# Patient Record
Sex: FEMALE | Race: BLACK OR AFRICAN AMERICAN | Employment: UNEMPLOYED | URBAN - METROPOLITAN AREA
[De-identification: names, ages, dates, MRNs, and addresses within clinical notes are randomized per-mention and may not be internally consistent; named-entity substitution may affect disease eponyms.]

---

## 2019-07-24 ENCOUNTER — TELEPHONE (OUTPATIENT)
Dept: FAMILY MEDICINE CLINIC | Age: 29
End: 2019-07-24

## 2019-07-25 ENCOUNTER — OFFICE VISIT (OUTPATIENT)
Dept: FAMILY MEDICINE CLINIC | Age: 29
End: 2019-07-25

## 2019-07-25 VITALS
BODY MASS INDEX: 22.01 KG/M2 | DIASTOLIC BLOOD PRESSURE: 75 MMHG | WEIGHT: 118.4 LBS | RESPIRATION RATE: 12 BRPM | HEART RATE: 100 BPM | TEMPERATURE: 99.4 F | SYSTOLIC BLOOD PRESSURE: 103 MMHG

## 2019-07-25 DIAGNOSIS — Z23 NEED FOR VACCINE FOR DT (DIPHTHERIA-TETANUS): ICD-10-CM

## 2019-07-25 DIAGNOSIS — R06.02 SOB (SHORTNESS OF BREATH): ICD-10-CM

## 2019-07-25 DIAGNOSIS — Z11.4 ENCOUNTER FOR SCREENING FOR HIV: ICD-10-CM

## 2019-07-25 DIAGNOSIS — Z13.220 SCREENING CHOLESTEROL LEVEL: ICD-10-CM

## 2019-07-25 DIAGNOSIS — R07.89 CHEST TIGHTNESS: Primary | ICD-10-CM

## 2019-07-25 PROCEDURE — 90714 TD VACC NO PRESV 7 YRS+ IM: CPT | Performed by: FAMILY MEDICINE

## 2019-07-25 PROCEDURE — 90471 IMMUNIZATION ADMIN: CPT | Performed by: FAMILY MEDICINE

## 2019-07-25 PROCEDURE — 99214 OFFICE O/P EST MOD 30 MIN: CPT | Performed by: FAMILY MEDICINE

## 2019-07-25 RX ORDER — ALBUTEROL SULFATE 90 UG/1
2 AEROSOL, METERED RESPIRATORY (INHALATION) EVERY 6 HOURS PRN
Qty: 3 INHALER | Refills: 1 | Status: SHIPPED | OUTPATIENT
Start: 2019-07-25

## 2020-01-09 ENCOUNTER — CLINICAL DOCUMENTATION (OUTPATIENT)
Dept: PSYCHOLOGY | Age: 30
End: 2020-01-09

## 2020-01-09 ENCOUNTER — OFFICE VISIT (OUTPATIENT)
Dept: FAMILY MEDICINE CLINIC | Age: 30
End: 2020-01-09

## 2020-01-09 VITALS
BODY MASS INDEX: 20.78 KG/M2 | RESPIRATION RATE: 14 BRPM | WEIGHT: 111.8 LBS | HEART RATE: 66 BPM | SYSTOLIC BLOOD PRESSURE: 106 MMHG | DIASTOLIC BLOOD PRESSURE: 68 MMHG

## 2020-01-09 PROCEDURE — 99213 OFFICE O/P EST LOW 20 MIN: CPT | Performed by: FAMILY MEDICINE

## 2020-01-09 ASSESSMENT — PATIENT HEALTH QUESTIONNAIRE - PHQ9
SUM OF ALL RESPONSES TO PHQ QUESTIONS 1-9: 0
SUM OF ALL RESPONSES TO PHQ QUESTIONS 1-9: 0
1. LITTLE INTEREST OR PLEASURE IN DOING THINGS: 0
2. FEELING DOWN, DEPRESSED OR HOPELESS: 0
SUM OF ALL RESPONSES TO PHQ9 QUESTIONS 1 & 2: 0

## 2020-01-09 NOTE — PROGRESS NOTES
Patient is here for panic and anxiety . She has had 3-4 episodes in the past 2 weeks. One she noticed after smoking marijuana , which she does not usually do. That attack lasted 24 hours. One episode was at work in the am .  She works at [employer] She does not recall specifics of the other 2 attacks. The attacks lasted 45 minutes and then started again and was sent home. She does not get stressed with work or bosses. She gets shortness of breath ,  Palpitations , dizziness and shaky. She did not get chest pain . Her top stressors are relationship and finances. He does not smoke. She works a lot - 61 hour/ week. He works 40 hours/ week. He jokes about her working so much. Her sleeping varies the past 1 year . She cn go to bed at 9 pm or not until 3am.  She sleeps 4-7 hours/ night. At times she does not go to bed due to not tired. Her prior job involved 4 pm - 4 am.  She has been at new job for 7 months and is happier there. No suicidal or homicidal ideation. Denies depression. No regular caffeine. She no longer drinks Red Bull. Drinks alcohol - beer 1 per day. Starting to meditate. Walks to walk and back 20 minutes each way. Patient did not do prior labs and does not want to do today or in near future. Denies weight changes, constipation or diarrhea, or heat/ cold intolerance. Review of Systems    ROS: All other systems were reviewed and are negative . Patient's allergies and medications were reviewed. Patient's past medical, surgical, social , and family history were reviewed. OBJECTIVE:  /68   Pulse 66   Resp 14   Wt 111 lb 12.8 oz (50.7 kg)   LMP 12/07/2019   Breastfeeding? No   BMI 20.78 kg/m²     Physical Exam    General: NAD, cooperative, alert and oriented X 3. Mood / affect is good. good insight. well hydrated. HEENT: PERRLA, EOMI, TMs - clear. Nasopharynx clear. Neck : no lymphadenopathy, supple, FROM  CV: Regular rate and rhythm.  JEANIE 3/6 , no  rub/

## 2020-01-09 NOTE — PATIENT INSTRUCTIONS
Patient Education        Panic Attacks: Care Instructions  Your Care Instructions    During a panic attack, you may have a feeling of intense fear or terror, trouble breathing, chest pain or tightness, heartbeat changes, dizziness, sweating, and shaking. A panic attack starts suddenly and usually lasts from 5 to 20 minutes but may last even longer. You have the most anxiety about 10 minutes after the attack starts. An attack can begin with a stressful event, or it can happen without a cause. Although panic attacks can cause scary symptoms, you can learn to manage them with self-care, counseling, and medicine. Follow-up care is a key part of your treatment and safety. Be sure to make and go to all appointments, and call your doctor if you are having problems. It's also a good idea to know your test results and keep a list of the medicines you take. How can you care for yourself at home? · Take your medicine exactly as directed. Call your doctor if you think you are having a problem with your medicine. · Go to your counseling sessions and follow-up appointments. · Recognize and accept your anxiety. Then, when you are in a situation that makes you anxious, say to yourself, \"This is not an emergency. I feel uncomfortable, but I am not in danger. I can keep going even if I feel anxious. \"  · Be kind to your body:  ? Relieve tension with exercise or a massage. ? Get enough rest.  ? Avoid alcohol, caffeine, nicotine, and illegal drugs. They can increase your anxiety level, cause sleep problems, or trigger a panic attack. ? Learn and do relaxation techniques. See below for more about these techniques. · Engage your mind. Get out and do something you enjoy. Go to a funny movie, or take a walk or hike. Plan your day. Having too much or too little to do can make you anxious. · Keep a record of your symptoms.  Discuss your fears with a good friend or family member, or join a support group for people with similar problems. Talking to others sometimes relieves stress. · Get involved in social groups, or volunteer to help others. Being alone sometimes makes things seem worse than they are. · Get at least 30 minutes of exercise on most days of the week to relieve stress. Walking is a good choice. You also may want to do other activities, such as running, swimming, cycling, or playing tennis or team sports. Relaxation techniques  Do relaxation exercises for 10 to 20 minutes a day. You can play soothing, relaxing music while you do them, if you wish. · Tell others in your house that you are going to do your relaxation exercises. Ask them not to disturb you. · Find a comfortable place, away from all distractions and noise. · Lie down on your back, or sit with your back straight. · Focus on your breathing. Make it slow and steady. · Breathe in through your nose. Breathe out through either your nose or mouth. · Breathe deeply, filling up the area between your navel and your rib cage. Breathe so that your belly goes up and down. · Do not hold your breath. · Breathe like this for 5 to 10 minutes. Notice the feeling of calmness throughout your whole body. As you continue to breathe slowly and deeply, relax by doing the following for another 5 to 10 minutes:  · Tighten and relax each muscle group in your body. You can begin at your toes and work your way up to your head. · Imagine your muscle groups relaxing and becoming heavy. · Empty your mind of all thoughts. · Let yourself relax more and more deeply. · Become aware of the state of calmness that surrounds you. · When your relaxation time is over, you can bring yourself back to alertness by moving your fingers and toes and then your hands and feet and then stretching and moving your entire body. Sometimes people fall asleep during relaxation, but they usually wake up shortly afterward.   · Always give yourself time to return to full alertness before you drive a car or

## 2020-02-05 ENCOUNTER — OFFICE VISIT (OUTPATIENT)
Dept: PSYCHOLOGY | Age: 30
End: 2020-02-05

## 2020-02-05 PROCEDURE — 90791 PSYCH DIAGNOSTIC EVALUATION: CPT | Performed by: PSYCHOLOGIST

## 2020-02-05 ASSESSMENT — PATIENT HEALTH QUESTIONNAIRE - PHQ9
SUM OF ALL RESPONSES TO PHQ QUESTIONS 1-9: 4
SUM OF ALL RESPONSES TO PHQ9 QUESTIONS 1 & 2: 0
4. FEELING TIRED OR HAVING LITTLE ENERGY: 2
8. MOVING OR SPEAKING SO SLOWLY THAT OTHER PEOPLE COULD HAVE NOTICED. OR THE OPPOSITE, BEING SO FIGETY OR RESTLESS THAT YOU HAVE BEEN MOVING AROUND A LOT MORE THAN USUAL: 0
SUM OF ALL RESPONSES TO PHQ QUESTIONS 1-9: 4
3. TROUBLE FALLING OR STAYING ASLEEP: 0
5. POOR APPETITE OR OVEREATING: 1
6. FEELING BAD ABOUT YOURSELF - OR THAT YOU ARE A FAILURE OR HAVE LET YOURSELF OR YOUR FAMILY DOWN: 1
9. THOUGHTS THAT YOU WOULD BE BETTER OFF DEAD, OR OF HURTING YOURSELF: 0
7. TROUBLE CONCENTRATING ON THINGS, SUCH AS READING THE NEWSPAPER OR WATCHING TELEVISION: 0
2. FEELING DOWN, DEPRESSED OR HOPELESS: 0
1. LITTLE INTEREST OR PLEASURE IN DOING THINGS: 0

## 2020-02-05 ASSESSMENT — ANXIETY QUESTIONNAIRES
4. TROUBLE RELAXING: 1-SEVERAL DAYS
5. BEING SO RESTLESS THAT IT IS HARD TO SIT STILL: 0-NOT AT ALL
1. FEELING NERVOUS, ANXIOUS, OR ON EDGE: 0-NOT AT ALL
7. FEELING AFRAID AS IF SOMETHING AWFUL MIGHT HAPPEN: 1-SEVERAL DAYS
2. NOT BEING ABLE TO STOP OR CONTROL WORRYING: 0-NOT AT ALL
6. BECOMING EASILY ANNOYED OR IRRITABLE: 3-NEARLY EVERY DAY
3. WORRYING TOO MUCH ABOUT DIFFERENT THINGS: 0-NOT AT ALL
GAD7 TOTAL SCORE: 5

## 2020-02-05 NOTE — PROGRESS NOTES
Caffeine: Cut out most caffeine. Mental health history: Dealt with depression in HS. Tried to jump off her school building when she was failing school, disappointed her school building. Her friend talked her out of it. Pt saw a therapist who wasn't helpful. Stayed depressed for awhile. Drank alcohol, wrote poetry in her room to cope. SI/HI: A few instances of SI a few years ago. Thought about crashing her car into a wall, but no intent. Also thought about cutting herself. Calls her best friend when she needs support. Social History     Tobacco Use    Smoking status: Former Smoker     Packs/day: 0.20     Years: 1.00     Pack years: 0.20     Types: Cigarettes    Smokeless tobacco: Never Used   Substance Use Topics    Alcohol use: Yes     Alcohol/week: 0.8 standard drinks     Types: 1 Standard drinks or equivalent per week      Illicit drugs:   Social History     Substance and Sexual Activity   Drug Use No        O:  Patient reported that she started experiencing panic attacks 1.5 months ago that were particularly concerning given her history of VSD. Since stopping tobacco, marijuana, and caffeine use, she has noticed a significant improvement in the symptoms. She has a history of depression during high school with suicidal ideation at that time. She denied recent SI/HI, plan or intent. Safe for outpatient level of care at this time. Patient has a strong support system. She drinks alcohol regularly. Normalized and validated pt's distress. Provided psychoeducation about panic disorder, anxiety, the body's stress reaction, and mindfulness. Practiced diaphragmatic breathing and breathing to calm panic symptoms, as well as grounding during visit. Set behavioral goals related to exercise. Provided a list of positive coping statements as well. Highlighted the importance of continued abstention from tobacco, marijuana, and caffeine use.         A:  MSE:  Appearance: good hygiene  and appropriate attire  Attitude: cooperative, friendly and mild distress  Consciousness: alert  Orientation: oriented to person, place, time, general circumstance  Memory: recent and remote memory intact  Attention/Concentration: intact during session  Psychomotor Activity: normal  Eye Contact: normal  Speech: normal rate and volume, well-articulated  Mood: anxious  Affect: congruent  Perception: within normal limits  Thought Content: within normal limits  Thought Process: logical, coherent and goal-directed  Insight: good  Judgment: intact  Ability to understand instructions: Yes  Ability to respond meaningfully: Yes  Morbid Ideation: no   Suicide Assessment: no suicidal ideation, plan, or intent  Homicidal Ideation: no      LALITA 7 SCORE 2/5/2020   LALITA-7 Total Score 5     Interpretation of LALITA-7 score: 5-9 = mild anxiety, 10-14 = moderate anxiety, 15+ = severe anxiety. Recommend referral to behavioral health for scores 10 or greater. PHQ Scores 2/5/2020 1/9/2020   PHQ2 Score 0 0   PHQ9 Score 4 0     Interpretation of Total Score Depression Severity: 1-4 = Minimal depression, 5-9 = Mild depression, 10-14 = Moderate depression, 15-19 = Moderately severe depression, 20-27 = Severe depression    Diagnosis:    1.  Panic disorder        Patient Active Problem List   Diagnosis    Chest pain    Dysmenorrhea    Herpes simplex virus (HSV) infection    Ventricular septal defect         Plan:  Pt interventions:  Established rapport, Taberg-setting to identify pt's primary goals for [facility] visit / overall health, Supportive techniques, Provided Psychoeducation re: panic disorder, anxiety, the body's stress reaction, mindfulness, Emphasized self-care as important for managing overall health, Provided handout on Panic disorder, mindfulness, diaphragmatic breathing, grounding, positive coping statements, Discussed and set plan for behavioral activation and Trained in relaxation strategies including Diaphragmatic breathing, breathing to calm

## 2020-02-05 NOTE — PATIENT INSTRUCTIONS
Changing Thinking Patterns  One of the most important changes associated with decreasing panic attacks is changing how we think. The fear associated with having a panic attack may increase the likelihood of having an attack. Therefore, a willingness to experience a panic attack, knowing that the symptoms, although uncomfortable, will not harm you, is an important aspect of managing the symptoms of panic. Thinking that increases panic    Thinking that decreases panic  Im having a heart attack! This is not an emergency. Im going to die. This doesnt feel good, but it wont hurt me. I cant stand this. I can feel uncomfortable and still be OK. I have to get out of here. This will go away with time. Oh no, here it comes! I can handle this. What are the things that you say to yourself that may increase panic symptoms? What could you say to decrease panic symptoms? Breathing Retraining    People who have panic attacks show some signs of hyperventilation or overbreathing. When people hyperventilate, certain blood vessels in the body become narrower, which can contribute to numbness or tingling in the hands or feet or the sensation of cold, clammy hands and increased heart rate. You can help overcome overbreathing by learning breathing control. Instructions for Breathing Retrainin.  Choose a Mindfulness  · Pay attention to your breathing  · Take a mindful walk  · Eat mindfully  · Ground yourself in your five senses  · Journal  · Try dishwashing, cleaning and doing laundry a little bit slower than you usually do  · Meditate        Diaphragmatic Breathing    \"The entire autonomic nervous system (and through it, our internal organs and glands) is largely driven by our breathing patterns. By changing our breathing we can influence millions of biochemical reactions in our body, producing more relaxing substances such as endorphins and fewer anxiety-producing ones like adrenaline and higher blood acidity. Mindfulness of the breath is so effective that it is common to all meditative and prayer traditions. \" Anxiety Fear & Breathing - Breathing. com    \"When overcoming high levels of anxiety, it is important to learn the techniques of correct breathing. Many people who live with high levels of anxiety are known to breathe through their chest. Shallow breathing through the chest means you are disrupting the balance of oxygen and carbon dioxide necessary to be in a relaxed state. This type of breathing will perpetuate the symptoms of anxiety. \" [email or web address]. com      What Is Diaphragmatic Breathing? Diaphragmatic breathing is a technique that helps you slow down your breathing when feeling stressed or anxious by using your diaphragm muscle to bring about a state of physiological relaxation. [place] babies naturally breathe this way, and singers, wind instrument players, and yoga practitioners also use this type of breathing. The diaphragm is a large muscle that rests across the bottom of your rib cage. When you inhale, the diaphragm muscle drops, opening up space so air can come in. When watching someone do this, it looks like your stomach is filling with air. This type of breathing helps activate the part of your nervous system that controls relaxation.  It can lead to decreased heart rate, blood pressure, anxiety, but find your  own comfortable breathing rhythm. These cycles regulate the amount of oxygen you  take in so that you do not experience the fainting, tingling, and giddy sensations that are  sometimes associated with overbreathing. Helpful Hints  Make sure that you arent hyperventitating; it is important to pause for a second or two after each breath. Try to breathe from your diaphragm or abdomen. Your shoulders and chest area  should be fairly relaxed and still. If this is challenging at first, it can be helpful to  first try this exercise by lying down on the floor with one hand on your heart, the  other hand on your abdomen. Watch the hand on your abdomen rise as you fill  your lungs with air, expanding your chest.     Rules of Practice   Try diaphragmatic breathing for one to two minutes throughout the day. You do not need to be feeling anxious to practice - in fact, at first you  should practice while feeling relatively calm. You need to be comfortable  breathing this way when feeling calm before you can feel comfortable doing it  when anxious. Sofy Rao gradually master this skill and feel the benefits! Once you are comfortable with this technique, you will feel more comfortable using it in situations that cause anxiety. Grounding  Grounding is a technique that helps keep you focused on the present moment by reorienting you to reality in the here-and-now. Grounding skills can be helpful in managing overwhelming feelings or intense anxiety. They help you to regain your mental focus from an often intensely emotional state. Grounding skills occur within two specific approaches:   Sensory Awareness and Cognitive Awareness    1. Sensory Awareness (awareness of senses)    Grounding Exercise #1:   Keep your eyes open, look around the room, notice your surroundings, notice  details.  Hold a pillow, stuffed animal or a ball.    Place a cool cloth or hold something cool (e.g., can of soda) on your forehead or the inside of your wrist   Listen to soothing music   Put your feet firmly on the ground   FOCUS on someones voice or a neutral conversation. Grounding Exercise #2:   The [id number] Exercise   Name 5 things you can see in the room with you.  Name 4 things you can feel Theola Bustard on my back or feet on floor)   Name 3 things you can hear right now (fingers tapping on keyboard or tv)   Name 2 things you can smell right now (or, 2 things you like the smell of)   Name 1 good thing about yourself    Grounding Exercise #3  5/5/5 Grounding Exercise  What are 5 things you can see? What are 5 things you can feel? What are 5 things you can hear?    -Repeat with 4 different observations for each, 3 different for each, 2 different for each, etc.   -If you get to 1 and are still feeling anxious, re-start at 5 with 5 different things you can see. 2. Cognitive Awareness (awareness of thoughts)    Grounding Exercise #4: Re-orient yourself in place and time by asking yourself some or all of these questions:  1. Where am I?  2. What is today? 3. What is the date? 4. What is the month? 5. What is the year? 6. How old am I?  7. What season is it?

## 2020-03-04 ENCOUNTER — OFFICE VISIT (OUTPATIENT)
Dept: PSYCHOLOGY | Age: 30
End: 2020-03-04

## 2020-03-04 PROCEDURE — 90832 PSYTX W PT 30 MINUTES: CPT | Performed by: PSYCHOLOGIST

## 2020-03-04 ASSESSMENT — PATIENT HEALTH QUESTIONNAIRE - PHQ9
2. FEELING DOWN, DEPRESSED OR HOPELESS: 0
6. FEELING BAD ABOUT YOURSELF - OR THAT YOU ARE A FAILURE OR HAVE LET YOURSELF OR YOUR FAMILY DOWN: 1
9. THOUGHTS THAT YOU WOULD BE BETTER OFF DEAD, OR OF HURTING YOURSELF: 0
SUM OF ALL RESPONSES TO PHQ QUESTIONS 1-9: 4
8. MOVING OR SPEAKING SO SLOWLY THAT OTHER PEOPLE COULD HAVE NOTICED. OR THE OPPOSITE, BEING SO FIGETY OR RESTLESS THAT YOU HAVE BEEN MOVING AROUND A LOT MORE THAN USUAL: 0
7. TROUBLE CONCENTRATING ON THINGS, SUCH AS READING THE NEWSPAPER OR WATCHING TELEVISION: 0
1. LITTLE INTEREST OR PLEASURE IN DOING THINGS: 0
3. TROUBLE FALLING OR STAYING ASLEEP: 1
SUM OF ALL RESPONSES TO PHQ QUESTIONS 1-9: 4
SUM OF ALL RESPONSES TO PHQ9 QUESTIONS 1 & 2: 0
5. POOR APPETITE OR OVEREATING: 1
4. FEELING TIRED OR HAVING LITTLE ENERGY: 1

## 2020-03-04 ASSESSMENT — ANXIETY QUESTIONNAIRES
6. BECOMING EASILY ANNOYED OR IRRITABLE: 1-SEVERAL DAYS
3. WORRYING TOO MUCH ABOUT DIFFERENT THINGS: 0-NOT AT ALL
5. BEING SO RESTLESS THAT IT IS HARD TO SIT STILL: 0-NOT AT ALL
1. FEELING NERVOUS, ANXIOUS, OR ON EDGE: 1-SEVERAL DAYS
4. TROUBLE RELAXING: 0-NOT AT ALL
GAD7 TOTAL SCORE: 3
7. FEELING AFRAID AS IF SOMETHING AWFUL MIGHT HAPPEN: 1-SEVERAL DAYS
2. NOT BEING ABLE TO STOP OR CONTROL WORRYING: 0-NOT AT ALL

## 2020-04-01 ENCOUNTER — TELEPHONE (OUTPATIENT)
Dept: FAMILY MEDICINE CLINIC | Age: 30
End: 2020-04-01

## 2020-04-01 NOTE — TELEPHONE ENCOUNTER
Patient said she has VSD and has been having panic attacks and trouble breathing. She would like to be referred to a cardiologist just to be sure everything is OK. Please advise. Thanks.

## 2020-04-03 ENCOUNTER — TELEMEDICINE (OUTPATIENT)
Dept: FAMILY MEDICINE CLINIC | Age: 30
End: 2020-04-03

## 2020-04-03 PROCEDURE — 99213 OFFICE O/P EST LOW 20 MIN: CPT | Performed by: FAMILY MEDICINE

## 2020-04-03 NOTE — PROGRESS NOTES
4/3/2020    TELEHEALTH EVALUATION -- Audio/Visual (During [facility]-60 public health emergency)    Due to COVID 19 outbreak, patient's office visit was converted to a virtual visit. Patient was contacted and agreed to proceed with a virtual visit via [address] Visit  The risks and benefits of converting to a virtual visit were discussed in light of the current infectious disease epidemic. Patient also understood that insurance coverage and co-pays are up to their individual insurance plans. HPI:    Angelica Pandey (:  1990) has requested an audio/video evaluation for the following concern(s):    Patient with some intermittent shortness of breath and palpitations and some anxiety. She is sleeping okay - 11-8 am.  Some fatigue. Unemployed at home. She finds she gets some shortness of breath and shakiness at rest, but not when walking the dog or when she is working out 2 hours per day :\"cause she does not have anything else to do. \"  Denies dizziness with episodes. At times feels palpitations. Denies symptoms with exercise or with walking the dog, which she does daily. She and her  are quarantined. Admits to some anxiety about everything . At times she is able to calm herself down fairly easily, but at other times she will struggle. Denies cough or fever. Mood is okay for the most part except intermittent anxiety. States she prefers to not take anxiety medications when discussed. She has h/o VSD , MVP and has not seen a cardiologist likely since . Inquiring if she needs to see a cardiologist now. Denies shortness of breath while talking with me. Avoiding alcohol or caffeine. Review of Systems    Prior to Visit Medications    Medication Sig Taking? Authorizing Provider   albuterol sulfate  (90 Base) MCG/ACT inhaler Inhale 2 puffs into the lungs every 6 hours as needed for Wheezing Or 30 minutes prior to walking to walk.   Radha Sumner MD   Multiple Vitamin (MULTIVITAMIN PO) Take 1 capsule by mouth daily. Historical Provider, MD   albuterol (PROVENTIL HFA;VENTOLIN HFA) 108 (90 BASE) MCG/ACT inhaler Inhale 2 puffs into the lungs. Historical Provider, MD       Allergies   Allergen Reactions    Aleve [Naproxen Sodium] Shortness Of Breath    [place] Plus Cold [Chlorphen-Phenyleph-Asa] Shortness Of Breath    Ibuprofen Shortness Of Breath       Social History     Tobacco Use    Smoking status: Former Smoker     Packs/day: 0.20     Years: 1.00     Pack years: 0.20     Types: Cigarettes    Smokeless tobacco: Never Used   Substance Use Topics    Alcohol use: Yes     Alcohol/week: 0.8 standard drinks     Types: 1 Standard drinks or equivalent per week    Drug use: No        Past Medical History:   Diagnosis Date    Cold sore     Congenital heart disease     VSD    Dysmenorrhea 8/2/2011    MVP (mitral valve prolapse)     Radius fracture 9/03    left     Ventricular septal defect 8/2/2011    Dr. Dahlia Nielsen- [facility];  antibiotic prophylaxis       No past surgical history on file. Health Maintenance   Topic Date Due    Varicella vaccine (1 of 2 - 2-dose childhood series) 09/19/1992    HIV screen  09/19/2006    Cervical cancer screen  09/19/2012    Flu vaccine (Season Ended) 09/01/2020    DTaP/Tdap/Td vaccine (7 - Td) 07/25/2029    Hepatitis B vaccine  Completed    Hib vaccine  Completed    Hepatitis A vaccine  Aged Out    Meningococcal (ACWY) vaccine  Aged Out    Pneumococcal 0-64 years Vaccine  Aged Out       Family History   Problem Relation Age of Onset    High Blood Pressure Mother     High Cholesterol Mother        PHYSICAL EXAMINATION:    Vital Signs: (As obtained by patient/caregiver or practitioner observation)     Heart rate= 72  Respiratory rate= 14 Temperature= 98.6. Weight =111   Height= 5'2\"      Constitutional:  Appears well-developed and well-nourished. No apparent distress                              Mental status  Alert and awake.  Oriented to

## 2020-06-19 ENCOUNTER — HOSPITAL ENCOUNTER (OUTPATIENT)
Age: 30
Discharge: HOME | End: 2020-06-19

## 2020-06-19 PROCEDURE — 93005 ELECTROCARDIOGRAM TRACING: CPT | Performed by: FAMILY MEDICINE

## 2020-06-21 LAB
EKG ATRIAL RATE: 92 BPM
EKG DIAGNOSIS: NORMAL
EKG P-R INTERVAL: 134 MS
EKG Q-T INTERVAL: 356 MS
EKG QRS DURATION: 68 MS
EKG QTC CALCULATION (BAZETT): 440 MS
EKG R AXIS: 57 DEGREES
EKG T AXIS: 195 DEGREES
EKG VENTRICULAR RATE: 92 BPM

## 2020-06-21 PROCEDURE — 93010 ELECTROCARDIOGRAM REPORT: CPT | Performed by: INTERNAL MEDICINE

## 2020-06-23 ENCOUNTER — TELEMEDICINE (OUTPATIENT)
Dept: FAMILY MEDICINE CLINIC | Age: 30
End: 2020-06-23

## 2020-06-23 ENCOUNTER — TELEPHONE (OUTPATIENT)
Dept: FAMILY MEDICINE CLINIC | Age: 30
End: 2020-06-23

## 2020-06-23 PROBLEM — F41.8 DEPRESSION WITH ANXIETY: Status: ACTIVE | Noted: 2020-06-23

## 2020-06-23 PROBLEM — F41.9 ANXIETY: Status: ACTIVE | Noted: 2020-06-23

## 2020-06-23 PROCEDURE — 99214 OFFICE O/P EST MOD 30 MIN: CPT | Performed by: FAMILY MEDICINE

## 2020-06-23 RX ORDER — ESCITALOPRAM OXALATE 10 MG/1
10 TABLET ORAL DAILY
Qty: 30 TABLET | Refills: 1 | Status: SHIPPED | OUTPATIENT
Start: 2020-06-23 | End: 2020-10-07

## 2020-06-23 NOTE — PROGRESS NOTES
Social History     Tobacco Use    Smoking status: Former Smoker     Packs/day: 0.20     Years: 1.00     Pack years: 0.20     Types: Cigarettes    Smokeless tobacco: Never Used   Substance Use Topics    Alcohol use: Yes     Alcohol/week: 0.8 standard drinks     Types: 1 Standard drinks or equivalent per week    Drug use: No        Past Medical History:   Diagnosis Date    Cold sore     Congenital heart disease     VSD    Dysmenorrhea 8/2/2011    MVP (mitral valve prolapse)     Radius fracture 9/03    left     Ventricular septal defect 8/2/2011    Dr. Bassam Nazario- [facility];  antibiotic prophylaxis       No past surgical history on file. Health Maintenance   Topic Date Due    Varicella vaccine (1 of 2 - 2-dose childhood series) 09/19/1992    HIV screen  09/19/2006    Cervical cancer screen  09/19/2012    Flu vaccine (Season Ended) 09/01/2020    DTaP/Tdap/Td vaccine (7 - Td) 07/25/2029    Hepatitis B vaccine  Completed    Hib vaccine  Completed    Hepatitis A vaccine  Aged Out    Meningococcal (ACWY) vaccine  Aged Out    Pneumococcal 0-64 years Vaccine  Aged Out       Family History   Problem Relation Age of Onset    High Blood Pressure Mother     High Cholesterol Mother        PHYSICAL EXAMINATION:    Vital Signs: (As obtained by patient/caregiver or practitioner observation)     Heart rate= 76  Respiratory rate= 14 Temperature= 98      Constitutional:  Appears well-developed and well-nourished. No apparent distress                              Mental status:  Alert and awake. Oriented to person/place/time. Able to follow commands       Eyes: EOM intact. Sclera-normal. No erythema of conjunctiva. No eye discharge. HENT: Normocephalic, atraumatic.   Mouth/Throat: normal. Mucous membranes are moist.      External Ears: Normal       Neck: No visualized mass      Pulmonary/Chest:  Respiratory effort normal.  No visualized signs of difficulty breathing or respiratory distress         Musculoskeletal: Normal gait with no signs of ataxia. Normal range of motion of neck. Neurological:         No Facial Asymmetry (Cranial nerve 7 motor function) (limited exam to video visit) . No gaze palsy              Skin:                     No significant exanthematous lesions or discoloration noted on facial skin                                        Psychiatric:          Normal Affect. No Hallucinations           Other pertinent observable physical exam findings:       ASSESSMENT/PLAN:  1. Abnormal EKG  - Reviewed EKG ordered at Urgent Care and done at [place] [address]; Future and follow up after completed/ prn.     2. Palpitations  - Avoid caffeine , alcohol and decongestants. - ECHO Stress Test; Future and follow up after completed/ prn.     3. Anxiety  - start Lexapro 5 mg (1/2 pill qd X 1-2 weeks) then Escitalopram (LEXAPRO) 10 MG tablet; Take 1 tablet by mouth daily  Dispense: 30 tablet; Refill: 1    Follow up 4 -6 weeks/ prn. The time that was spent with the family/patient addressing care on this video call was 20 minutes. An  electronic signature was used to authenticate this note. --Alfredo Ornelas MD on 6/23/2020 at 8:26 AM    Pursuant to the emergency declaration under the [facility] waiver authority and the [employer] Act, this Virtual  Visit was conducted, with patient's consent, to reduce the patient's risk of exposure to COVID-19 and provide continuity of care for an established patient. Services were provided through a video synchronous discussion virtually to substitute for in-person clinic visit.

## 2020-10-07 RX ORDER — ESCITALOPRAM OXALATE 10 MG/1
10 TABLET ORAL DAILY
Qty: 30 TABLET | Refills: 0 | Status: SHIPPED | OUTPATIENT
Start: 2020-10-07

## 2021-08-09 ENCOUNTER — VIRTUAL VISIT (OUTPATIENT)
Dept: PSYCHOLOGY | Age: 31
End: 2021-08-09

## 2021-08-09 DIAGNOSIS — F41.0 PANIC DISORDER: ICD-10-CM

## 2021-08-09 DIAGNOSIS — F33.1 MAJOR DEPRESSIVE DISORDER, RECURRENT EPISODE, MODERATE WITH ANXIOUS DISTRESS (HCC): Primary | ICD-10-CM

## 2021-08-09 PROCEDURE — 90832 PSYTX W PT 30 MINUTES: CPT | Performed by: PSYCHOLOGIST

## 2021-08-09 NOTE — PATIENT INSTRUCTIONS
Goals: 1. Practice being mindful - paying attention to the present moment on purpose and in a nonjudgmental way  2. Practice diaphragmatic breathing throughout the day. If you start to feel lightheaded, try shortening your in-breath (imagine oxygen is very expensive and you can only afford to take in a little bit) while lengthening your out-breath. 3. Practice grounding exercises - noticing what your senses are experiencing in the moment  4. Continue cutting out tobacco, marijuana, and caffeine  5. Continue doing yoga and other forms of physical activity such as walking  6. Read the list of positive coping statements and choose some to repeat to yourself when you start to feel panic symptoms  7. Consider journaling using an lanre like Day One  8. If suicidal thoughts intensify call the office during business hours, call [phone]) or [phone], text \"GO\" to [phone], or go to the ER. Partial Hospitalization Program and Intensive Outpatient Program Options    [facility]   [email or web address]  [phone] [address] [facility]  @ [email or web address].  [place] [facility]  Call [phone] or go to the ER at [facility] for an evaluation    Parviz Graves [address] [place] [address]   [phone]  Ty Pretty  [phone]    [facility] of [facility] R Smita Gilman 119  [address]  [phone]  [email or web address]     [facility]  Matt [address]  [address] [employer] [facility]  [phone]

## 2021-08-09 NOTE — PROGRESS NOTES
Behavioral Health Consultation  Selina Clifton Psy.D. Psychologist  8/9/2021  9:30-10 AM      Time spent with Patient: 30 minutes  This is patient's third [facility] appointment. Reason for Consult: Panic attacks  Referring Provider: Gennaro Kaba MD  [facility]    TELEHEALTH VISIT -- Audio/Visual (During FLZBV-38 public health emergency)  }  Pursuant to the emergency declaration under the [address] [place] [address], [facility] waiver authority and the [employer] Act, this Virtual Visit was conducted, with patient's consent, to reduce the patient's risk of exposure to COVID-19 and provide continuity of care for an established patient. Services were provided through a video synchronous discussion virtually to substitute for in-person clinic visit. Pt gave verbal informed consent to participate in telehealth services. Conducted a risk-benefit analysis and determined that the patient's presenting problems are consistent with the use of telepsychology. Determined that the patient has sufficient knowledge and skills in the use of technology enabling them to adequately benefit from telepsychology. It was determined that this patient was able to be properly treated without an in-person session. Patient verified that they were currently located at the address that was provided during registration. Verified the following information:  Patient's identification: Yes  Patient location: [address] [place] [address]  Patient's call back number: [phone]  Patient's emergency contact's name and number, as well as permission to contact them if needed:  Josh Cox [phone]    Provider location: [place] [address] [address]:  Pt reported that she has been dealing with significant depression and more frequent panic attacks. Cut her leg a few weeks ago to help her deal with distress.  Shared about marital stressors. Pt has been thinking about suicide, which is why she reached out. Almost went to the park the other day to cut her wrists. Her dog was a strong protective factor in that moment. She denied SI, plan or intent today. Pt has a couple friends she reaches out to regularly. Pt is ready to seek more treatment. O:  Interventions:  -Supportive techniques  -Processed recent stressors  -Conducted risk assessment (see below). Established a safety plan. -Discussed treatment options. Recommended a PHP or IOP, and pt was in agreement to pursue this option. -Explored interpersonal stressors. Encouraged more openness with her support system. Pt agreed. A:  MSE:  Appearance: good hygiene  and appropriate attire  Attitude: cooperative, friendly and mild to moderate distress  Consciousness: alert  Orientation: oriented to person, place, time, general circumstance  Memory: recent and remote memory intact  Attention/Concentration: intact during session  Psychomotor Activity: normal  Eye Contact: normal  Speech: normal rate and volume, well-articulated  Mood: anxious and dysphoric  Affect: congruent  Perception: within normal limits  Thought Content: within normal limits  Thought Process: logical, coherent and goal-directed  Insight: good  Judgment: intact  Ability to understand instructions: Yes  Ability to respond meaningfully: Yes  Morbid Ideation: passive thoughts of death  Suicide Assessment: suicidal ideation with specific plan but no intent  Homicidal Ideation: no    Safety: No imminent risk of danger to self/others based on the factors considered below. Appropriate for outpatient level of care. Safety plan includes: 911, PES, hotlines, and interventions discussed today.    Risk factors: Depressed mood, recent suicidal ideation with plan, access to lethal means, alcohol abuse  Protective factors: Age >24 and <55, female gender, denies current suicidal ideation, does not have access to guns, patient is and other forms of physical activity such as walking  6. Read the list of positive coping statements and choose some to repeat to yourself when you start to feel panic symptoms  7. Consider journaling using an lanre like Day One    Pt scheduled a F/U virtual visit.

## 2021-08-16 ENCOUNTER — VIRTUAL VISIT (OUTPATIENT)
Dept: PSYCHOLOGY | Age: 31
End: 2021-08-16

## 2021-08-16 DIAGNOSIS — F33.1 MAJOR DEPRESSIVE DISORDER, RECURRENT EPISODE, MODERATE WITH ANXIOUS DISTRESS (HCC): Primary | ICD-10-CM

## 2021-08-16 DIAGNOSIS — F41.0 PANIC DISORDER: ICD-10-CM

## 2021-08-16 PROCEDURE — 90832 PSYTX W PT 30 MINUTES: CPT | Performed by: PSYCHOLOGIST

## 2021-08-16 NOTE — PATIENT INSTRUCTIONS
Goals: 1. Practice being mindful - paying attention to the present moment on purpose and in a nonjudgmental way  2. Practice diaphragmatic breathing throughout the day. If you start to feel lightheaded, try shortening your in-breath (imagine oxygen is very expensive and you can only afford to take in a little bit) while lengthening your out-breath. 3. Practice grounding exercises - noticing what your senses are experiencing in the moment  4. Continue cutting out tobacco, marijuana, and caffeine  5. Continue doing yoga and other forms of physical activity such as walking  6. Read the list of positive coping statements and choose some to repeat to yourself when you start to feel panic symptoms  7.  Consider journaling using an lanre like Day One

## 2021-08-16 NOTE — PROGRESS NOTES
Behavioral Health Consultation  Rod Kelly Psy.D. Psychologist  8/16/2021  3:30-4 PM      Time spent with Patient: 30 minutes  This is patient's fourth [facility] appointment. Reason for Consult: Panic attacks  Referring Provider: Oseas Hawkins MD  [facility]    TELEHEALTH VISIT -- Audio/Visual (During VJZPW-62 public health emergency)  }  Pursuant to the emergency declaration under the [address] [place] [address] [facility] and the [employer] Act, this Virtual Visit was conducted, with patient's consent, to reduce the patient's risk of exposure to COVID-19 and provide continuity of care for an established patient. Services were provided through a video synchronous discussion virtually to substitute for in-person clinic visit. Pt gave verbal informed consent to participate in telehealth services. Conducted a risk-benefit analysis and determined that the patient's presenting problems are consistent with the use of telepsychology. Determined that the patient has sufficient knowledge and skills in the use of technology enabling them to adequately benefit from telepsychology. It was determined that this patient was able to be properly treated without an in-person session. Patient verified that they were currently located at the address that was provided during registration. Verified the following information:  Patient's identification: Yes  Patient location: [facility]  Patient's call back number: [phone]  Patient's emergency contact's name and number, as well as permission to contact them if needed:  Dirk Edward [phone]    Provider location: [address]:  Pt reported that she's been doing alright. Had SI while working 2 double shifts in a row. Believes she was very tired; went home and smoked marijuana, which helped. No plan or intent.  No SI any other time recently. Pt told her  about her distress; he is concerned about her. Discussed her marriage and ambivalence about wanting time for herself. Pt hasn't made phone calls yet to find treatment in the community but she does still want to pursue this. O:  Interventions:  -Supportive techniques  -Processed recent stressors  -Conducted risk assessment (see below). Appropriate for outpatient / telehealth care at this time, but a higher level of outpatient care was again strongly encouraged. Pt was in agreement.  -Discussed communication strategies  -Discussed pt's relationship with others vs her relationship with herself  -Assisted pt in getting scheduled to meet with her PCP later this week      A:  MSE:  Appearance: good hygiene  and appropriate attire  Attitude: cooperative, friendly and mild distress  Consciousness: alert  Orientation: oriented to person, place, time, general circumstance  Memory: recent and remote memory intact  Attention/Concentration: intact during session  Psychomotor Activity: normal  Eye Contact: normal  Speech: normal rate and volume, well-articulated  Mood: anxious and dysphoric  Affect: congruent  Perception: within normal limits  Thought Content: within normal limits  Thought Process: logical, coherent and goal-directed  Insight: good  Judgment: intact  Ability to understand instructions: Yes  Ability to respond meaningfully: Yes  Morbid Ideation: passive thoughts of death  Suicide Assessment: recent suicidal ideation without plan or intent. No current SI, plan or intent. Appropriate for outpatient / telehealth care at this time. Homicidal Ideation: no    Safety: No imminent risk of danger to self/others based on the factors considered below. Appropriate for outpatient level of care. Safety plan includes: 911, PES, hotlines, and interventions discussed today.    Risk factors: Depressed mood, recent suicidal ideation, alcohol abuse  Protective factors: Age >24 and <55, female gender, denies current suicidal ideation, does not have access to guns, patient is brittany for safety, no prior suicide attempts, no family h/o suicide, patient has social or family support, no active psychosis or cognitive dysfunction, physically healthy, already has outpatient services in place, compliant with recommended medications, and patient is future-oriented. LALITA 7 SCORE 3/4/2020 2/5/2020   LALITA-7 Total Score 3 5     Interpretation of LALITA-7 score: 5-9 = mild anxiety, 10-14 = moderate anxiety, 15+ = severe anxiety. Recommend referral to behavioral health for scores 10 or greater. PHQ Scores 3/4/2020 2/5/2020 1/9/2020   PHQ2 Score 0 0 0   PHQ9 Score 4 4 0     Interpretation of Total Score Depression Severity: 1-4 = Minimal depression, 5-9 = Mild depression, 10-14 = Moderate depression, 15-19 = Moderately severe depression, 20-27 = Severe depression    Diagnosis:    1. Major depressive disorder, recurrent episode, moderate with anxious distress ([facility]    2. Panic disorder        Patient Active Problem List   Diagnosis    Chest pain    Dysmenorrhea    Herpes simplex virus (HSV) infection    Ventricular septal defect    Anxiety         Plan:  Pt interventions:  Supportive techniques, Emphasized self-care as important for managing overall health, Cognitive strategies to target balanced thinking, Completed risk evaluation, Trained in improving communication skills and Discussed benefits of referral for specialty care for PHP or IOP, Created safety plan. Pt Behavioral Change Plan:  Pt set the following goals:  1. Practice being mindful - paying attention to the present moment on purpose and in a nonjudgmental way  2. Practice diaphragmatic breathing throughout the day. If you start to feel lightheaded, try shortening your in-breath (imagine oxygen is very expensive and you can only afford to take in a little bit) while lengthening your out-breath.   3. Practice grounding exercises - noticing what your senses are experiencing in the moment  4. Continue cutting out tobacco, marijuana, and caffeine  5. Continue doing yoga and other forms of physical activity such as walking  6. Read the list of positive coping statements and choose some to repeat to yourself when you start to feel panic symptoms  7. Consider journaling using an lanre like Day One    Pt scheduled a F/U virtual visit.

## 2021-08-19 ENCOUNTER — OFFICE VISIT (OUTPATIENT)
Dept: FAMILY MEDICINE CLINIC | Age: 31
End: 2021-08-19
Payer: COMMERCIAL

## 2021-08-19 VITALS
SYSTOLIC BLOOD PRESSURE: 112 MMHG | RESPIRATION RATE: 12 BRPM | TEMPERATURE: 97.7 F | BODY MASS INDEX: 21.15 KG/M2 | HEART RATE: 66 BPM | OXYGEN SATURATION: 97 % | WEIGHT: 113.8 LBS | DIASTOLIC BLOOD PRESSURE: 82 MMHG

## 2021-08-19 DIAGNOSIS — F41.8 DEPRESSION WITH ANXIETY: ICD-10-CM

## 2021-08-19 PROCEDURE — 99214 OFFICE O/P EST MOD 30 MIN: CPT | Performed by: FAMILY MEDICINE

## 2021-08-19 SDOH — ECONOMIC STABILITY: FOOD INSECURITY: WITHIN THE PAST 12 MONTHS, YOU WORRIED THAT YOUR FOOD WOULD RUN OUT BEFORE YOU GOT MONEY TO BUY MORE.: NEVER TRUE

## 2021-08-19 SDOH — ECONOMIC STABILITY: FOOD INSECURITY: WITHIN THE PAST 12 MONTHS, THE FOOD YOU BOUGHT JUST DIDN'T LAST AND YOU DIDN'T HAVE MONEY TO GET MORE.: NEVER TRUE

## 2021-08-19 ASSESSMENT — SOCIAL DETERMINANTS OF HEALTH (SDOH): HOW HARD IS IT FOR YOU TO PAY FOR THE VERY BASICS LIKE FOOD, HOUSING, MEDICAL CARE, AND HEATING?: NOT HARD AT ALL

## 2021-08-19 NOTE — PROGRESS NOTES
(Temporal)   Resp 12   Wt 113 lb 12.8 oz (51.6 kg)   SpO2 97%   BMI 21.15 kg/m²     Physical Exam    General: NAD, cooperative, alert and oriented X 3. Mood / affect is good. good insight. well hydrated. Neck : no lymphadenopathy, supple, FROM  CV: Regular rate and rhythm , no murmurs/ rub/ gallop. No edema. Lungs : CTA bilaterally, breathing comfortably  Abdomen: positive bowel sounds, soft , non tender, non distended. No hepatosplenomegaly. No CVA tenderness. Skin: no rashes. Non tender. ASSESSMENT/  PLAN:  1. Depression with anxiety  - Discussed medication options, but patient prefers to hold as improved from 2-3 weeks ago. - Continue counseling and advised to plan \"date nights\", particularly given difficulties in schedules. - Discussed if suicidal ideation occurs to call or go to ED if not able to get through to someone. - Marriage counseling may be helpful and encouraged to discuss with .   - Discussed new relationship may be exciting now, but need to determine longevity of relationship. She states she needs to figure out what she wants. Follow up in 2-3 months/ prn.

## 2021-09-08 ENCOUNTER — VIRTUAL VISIT (OUTPATIENT)
Dept: PSYCHOLOGY | Age: 31
End: 2021-09-08
Payer: COMMERCIAL

## 2021-09-08 DIAGNOSIS — F33.1 MAJOR DEPRESSIVE DISORDER, RECURRENT EPISODE, MODERATE WITH ANXIOUS DISTRESS (HCC): Primary | ICD-10-CM

## 2021-09-08 PROCEDURE — 90832 PSYTX W PT 30 MINUTES: CPT | Performed by: PSYCHOLOGIST

## 2021-09-08 NOTE — PROGRESS NOTES
Behavioral Health Consultation  Dileep Peng Psy.D. Psychologist  9/8/2021  11:30 AM - 12 PM      Time spent with Patient: 30 minutes  This is patient's fourth [facility] appointment. Reason for Consult: Panic attacks  Referring Provider: Dilcia Burks MD  [facility]    TELEHEALTH VISIT -- Audio/Visual (During [facility]-93 public health emergency)  }  Pursuant to the emergency declaration under the [address] [place] [address], [facility] waiver authority and the [employer] Act, this Virtual Visit was conducted, with patient's consent, to reduce the patient's risk of exposure to COVID-19 and provide continuity of care for an established patient. Services were provided through a video synchronous discussion virtually to substitute for in-person clinic visit. Pt gave verbal informed consent to participate in telehealth services. Conducted a risk-benefit analysis and determined that the patient's presenting problems are consistent with the use of telepsychology. Determined that the patient has sufficient knowledge and skills in the use of technology enabling them to adequately benefit from telepsychology. It was determined that this patient was able to be properly treated without an in-person session. Patient verified that they were currently located at the address that was provided during registration. Verified the following information:  Patient's identification: Yes  Patient location: [address] [place] [address]  Patient's call back number: [phone]  Patient's emergency contact's name and number, as well as permission to contact them if needed:  Angy [facility] [phone]    Provider location: [place]:  Pt reported that she decided to quit her job to relieve some of her stress. Pt has been more open with her .  Now attending couple's counseling, which has been uncomfortable for her. Resumed smoking cigarettes. Drinking alcohol more heavily lately; plans to cut back. Pt cut her leg again at work during a moment of high stress. No recent suicidal thoughts. O:  Interventions:  -Supportive techniques  -Processed recent stressors  -Reinforced her efforts towards self-care and to share more openly with her   -Conducted risk assessment (see below). A:  MSE:  Appearance: good hygiene  and appropriate attire  Attitude: cooperative, friendly and mild distress, calmer, slightly brighter  Consciousness: alert  Orientation: oriented to person, place, time, general circumstance  Memory: recent and remote memory intact  Attention/Concentration: intact during session  Psychomotor Activity: normal  Eye Contact: normal  Speech: normal rate and volume, well-articulated  Mood: anxious and dysphoric  Affect: congruent  Perception: within normal limits  Thought Content: within normal limits  Thought Process: logical, coherent and goal-directed  Insight: good  Judgment: intact  Ability to understand instructions: Yes  Ability to respond meaningfully: Yes  Morbid Ideation: no   Suicide Assessment: no suicidal ideation, plan, or intent. Appropriate for outpatient / telehealth care at this time. Homicidal Ideation: no    Safety: No imminent risk of danger to self/others based on the factors considered below. Appropriate for outpatient level of care. Safety plan includes: 911, PES, hotlines, and interventions discussed today.    Risk factors: Depressed mood, recent suicidal ideation, alcohol abuse  Protective factors: Age >24 and <55, female gender, denies current suicidal ideation, does not have access to guns, patient is brittany for safety, no prior suicide attempts, no family h/o suicide, patient has social or family support, no active psychosis or cognitive dysfunction, physically healthy, already has outpatient services in place, compliant with recommended medications, and patient is future-oriented. LALITA 7 SCORE 3/4/2020 2/5/2020   LALITA-7 Total Score 3 5     Interpretation of LALITA-7 score: 5-9 = mild anxiety, 10-14 = moderate anxiety, 15+ = severe anxiety. Recommend referral to behavioral health for scores 10 or greater. PHQ Scores 3/4/2020 2/5/2020 1/9/2020   PHQ2 Score 0 0 0   PHQ9 Score 4 4 0     Interpretation of Total Score Depression Severity: 1-4 = Minimal depression, 5-9 = Mild depression, 10-14 = Moderate depression, 15-19 = Moderately severe depression, 20-27 = Severe depression    Diagnosis:    1. Major depressive disorder, recurrent episode, moderate with anxious distress [facility])        Patient Active Problem List   Diagnosis    Chest pain    Dysmenorrhea    Herpes simplex virus (HSV) infection    Ventricular septal defect    Anxiety    Depression with anxiety         Plan:  Pt interventions:  Supportive techniques, Emphasized self-care as important for managing overall health, Cognitive strategies to target balanced thinking and Completed risk evaluation . Pt Behavioral Change Plan:  Pt set the following goals:  1. Practice being mindful - paying attention to the present moment on purpose and in a nonjudgmental way  2. Practice diaphragmatic breathing throughout the day. If you start to feel lightheaded, try shortening your in-breath (imagine oxygen is very expensive and you can only afford to take in a little bit) while lengthening your out-breath. 3. Practice grounding exercises - noticing what your senses are experiencing in the moment  4. Continue cutting out tobacco, marijuana, and caffeine  5. Continue doing yoga and other forms of physical activity such as walking  6. Read the list of positive coping statements and choose some to repeat to yourself when you start to feel panic symptoms  7. Consider journaling using an lanre like Day One    Pt scheduled a F/U virtual visit.

## 2021-09-22 ENCOUNTER — PATIENT MESSAGE (OUTPATIENT)
Dept: FAMILY MEDICINE CLINIC | Age: 31
End: 2021-09-22

## 2021-09-22 NOTE — TELEPHONE ENCOUNTER
From: Roshan Munoz  To: Cam Cabral MD  Sent: 9/22/2021 9:10 AM EDT  Subject: Non-Urgent Medical Question    Good morning    I have had a dry cough for about a week now. I've been drinking water and tea and taking cough drops but it doesn't seem to be going away. It is mild when I first wake up, and worsens at night when I'm trying to sleep. It feels like there is something small in my throat but when I cough or try to clear my throat nothing comes up. Is there over the counter medicine I can take to help or should I schedule an appointment to come visit. The only other symptom I have is the occasional running nose. Thanks for the help.       Noe Vega

## 2021-09-27 ENCOUNTER — VIRTUAL VISIT (OUTPATIENT)
Dept: PSYCHOLOGY | Age: 31
End: 2021-09-27
Payer: COMMERCIAL

## 2021-09-27 DIAGNOSIS — F33.1 MAJOR DEPRESSIVE DISORDER, RECURRENT EPISODE, MODERATE WITH ANXIOUS DISTRESS (HCC): Primary | ICD-10-CM

## 2021-09-27 DIAGNOSIS — F41.0 PANIC DISORDER: ICD-10-CM

## 2021-09-27 PROCEDURE — 90832 PSYTX W PT 30 MINUTES: CPT | Performed by: PSYCHOLOGIST

## 2021-09-27 NOTE — PATIENT INSTRUCTIONS
Goals: 1. Practice being mindful - paying attention to the present moment on purpose and in a nonjudgmental way  2. Practice diaphragmatic breathing throughout the day. If you start to feel lightheaded, try shortening your in-breath (imagine oxygen is very expensive and you can only afford to take in a little bit) while lengthening your out-breath. 3. Practice grounding exercises - noticing what your senses are experiencing in the moment  4. Continue cutting out tobacco, marijuana, and caffeine  5. Continue doing yoga and other forms of physical activity such as walking  6. Read the list of positive coping statements and choose some to repeat to yourself when you start to feel panic symptoms  7. Consider journaling using an lanre like Day One  8.  Consider reading Mating in [place] by [employer]

## 2021-09-27 NOTE — PROGRESS NOTES
Behavioral Health Consultation  Marialuisa Orr Psy.D. Psychologist  9/27/2021  11-11:30 AM      Time spent with Patient: 30 minutes  This is patient's fifth [facility] appointment. Reason for Consult: Panic attacks  Referring Provider: MD Kelvin Escoto [address] [place] [address]    TELEHEALTH VISIT -- Audio/Visual (During HGP-74 public health emergency)  }  Pursuant to the emergency declaration under the [address] [place] [address] waiver authority and the [employer] Act, this Virtual Visit was conducted, with patient's consent, to reduce the patient's risk of exposure to COVID-19 and provide continuity of care for an established patient. Services were provided through a video synchronous discussion virtually to substitute for in-person clinic visit. Pt gave verbal informed consent to participate in telehealth services. Conducted a risk-benefit analysis and determined that the patient's presenting problems are consistent with the use of telepsychology. Determined that the patient has sufficient knowledge and skills in the use of technology enabling them to adequately benefit from telepsychology. It was determined that this patient was able to be properly treated without an in-person session. Patient verified that they were currently located at the address that was provided during registration. Verified the following information:  Patient's identification: Yes  Patient location: [address] [place] [address]  Patient's call back number: [phone]  Patient's emergency contact's name and number, as well as permission to contact them if needed:  Yvonne Carpenter [phone]    Provider location: [address]:  Pt shared about a recent vacation to [facility], which she really enjoyed. Will leave her job in 1 week, after which she'll take a month off.  has been supportive.  Looking for a new couple's therapist. Looking forward to exercising and cooking more. Less alcohol use recently. Started smoking marijuana on a regular basis again. No recent SI or thoughts about self-harm. No panic attacks awhile. Not taking psychotropic medications currently. O:  Interventions:  -Supportive techniques  -Processed recent experiences and stressors  -Reinforced her efforts towards self-care  -Discussed relationship dynamics  -Recommended she consider reading Mating in Captivity by Perla Perez  -Conducted risk assessment. Appropriate for outpatient / telehealth care at this time. A:  MSE:  Appearance: good hygiene  and appropriate attire  Attitude: cooperative, friendly and mild distress, fatigued  Consciousness: alert  Orientation: oriented to person, place, time, general circumstance  Memory: recent and remote memory intact  Attention/Concentration: intact during session  Psychomotor Activity: normal  Eye Contact: normal  Speech: normal rate and volume, well-articulated  Mood: anxious and dysphoric  Affect: congruent  Perception: within normal limits  Thought Content: within normal limits  Thought Process: logical, coherent and goal-directed  Insight: good  Judgment: intact  Ability to understand instructions: Yes  Ability to respond meaningfully: Yes  Morbid Ideation: no   Suicide Assessment: no suicidal ideation, plan, or intent. Appropriate for outpatient / telehealth care at this time. Homicidal Ideation: no    Safety: No imminent risk of danger to self/others based on the factors considered below. Appropriate for outpatient level of care. Safety plan includes: 911, PES, hotlines, and interventions discussed today.    Risk factors: Depressed mood, occasional suicidal ideation, alcohol abuse  Protective factors: Age >24 and <55, female gender, denies current suicidal ideation, does not have access to guns, patient is brittany for safety, no prior suicide attempts, no family h/o suicide, patient has social or family support, no active psychosis or cognitive dysfunction, physically healthy, already has outpatient services in place, compliant with recommended medications, and patient is future-oriented. LALITA 7 SCORE 3/4/2020 2/5/2020   LALITA-7 Total Score 3 5     Interpretation of LALITA-7 score: 5-9 = mild anxiety, 10-14 = moderate anxiety, 15+ = severe anxiety. Recommend referral to behavioral health for scores 10 or greater. PHQ Scores 3/4/2020 2/5/2020 1/9/2020   PHQ2 Score 0 0 0   PHQ9 Score 4 4 0     Interpretation of Total Score Depression Severity: 1-4 = Minimal depression, 5-9 = Mild depression, 10-14 = Moderate depression, 15-19 = Moderately severe depression, 20-27 = Severe depression    Diagnosis:    1. Major depressive disorder, recurrent episode, moderate with anxious distress ([facility] [address]    2. Panic disorder        Patient Active Problem List   Diagnosis    Chest pain    Dysmenorrhea    Herpes simplex virus (HSV) infection    Ventricular septal defect    Anxiety    Depression with anxiety         Plan:  Pt interventions:  Supportive techniques, Emphasized self-care as important for managing overall health, Cognitive strategies to target balanced thinking, Completed risk evaluation and Provided pt book recommendation . Pt Behavioral Change Plan:  Pt set the following goals:  1. Practice being mindful - paying attention to the present moment on purpose and in a nonjudgmental way  2. Practice diaphragmatic breathing throughout the day. If you start to feel lightheaded, try shortening your in-breath (imagine oxygen is very expensive and you can only afford to take in a little bit) while lengthening your out-breath. 3. Practice grounding exercises - noticing what your senses are experiencing in the moment  4. Continue cutting out tobacco, marijuana, and caffeine  5. Continue doing yoga and other forms of physical activity such as walking  6.  Read the list of positive coping statements and choose some to repeat to yourself when you start to feel panic symptoms  7. Consider journaling using an lanre like Day One  8. Consider reading Mating in Captivity by Jessica Iglesias    Pt scheduled a F/U virtual visit.

## 2021-10-19 ENCOUNTER — VIRTUAL VISIT (OUTPATIENT)
Dept: PSYCHOLOGY | Age: 31
End: 2021-10-19
Payer: COMMERCIAL

## 2021-10-19 DIAGNOSIS — F41.0 PANIC DISORDER: ICD-10-CM

## 2021-10-19 DIAGNOSIS — F33.1 MAJOR DEPRESSIVE DISORDER, RECURRENT EPISODE, MODERATE WITH ANXIOUS DISTRESS (HCC): Primary | ICD-10-CM

## 2021-10-19 PROCEDURE — 90832 PSYTX W PT 30 MINUTES: CPT | Performed by: PSYCHOLOGIST

## 2021-10-19 NOTE — PROGRESS NOTES
Behavioral Health Consultation  Clarita Clifford Psy.D. Psychologist  10/19/2021  11-11:30 AM      Time spent with Patient: 30 minutes  This is patient's sixth [facility] appointment. Reason for Consult: Panic attacks  Referring Provider: Suzy Gilmore MD  [facility]    TELEHEALTH VISIT -- Audio/Visual (During OQSGT-30 public health emergency)  }  Pursuant to the emergency declaration under the [address] [place] [address] waiver authority and the [employer] Act, this Virtual Visit was conducted, with patient's consent, to reduce the patient's risk of exposure to COVID-19 and provide continuity of care for an established patient. Services were provided through a video synchronous discussion virtually to substitute for in-person clinic visit. Pt gave verbal informed consent to participate in telehealth services. Conducted a risk-benefit analysis and determined that the patient's presenting problems are consistent with the use of telepsychology. Determined that the patient has sufficient knowledge and skills in the use of technology enabling them to adequately benefit from telepsychology. It was determined that this patient was able to be properly treated without an in-person session. Patient verified that they were currently located at the address that was provided during registration. Verified the following information:  Patient's identification: Yes  Patient location: Park across the [address] from [address] in [place]  Patient's call back number: [phone]  Patient's emergency contact's name and number, as well as permission to contact them if needed:  Nabil Morillo [phone]    Provider location: [place] [address] [address]:  Pt shared about reduced stress since she cut back on work. Shopping more, taking better care of her house. Reflected on her marriage.  Pt has been more open with her recommended medications, and patient is future-oriented. LALITA 7 SCORE 3/4/2020 2/5/2020   LALITA-7 Total Score 3 5     Interpretation of LALITA-7 score: 5-9 = mild anxiety, 10-14 = moderate anxiety, 15+ = severe anxiety. Recommend referral to behavioral health for scores 10 or greater. PHQ Scores 3/4/2020 2/5/2020 1/9/2020   PHQ2 Score 0 0 0   PHQ9 Score 4 4 0     Interpretation of Total Score Depression Severity: 1-4 = Minimal depression, 5-9 = Mild depression, 10-14 = Moderate depression, 15-19 = Moderately severe depression, 20-27 = Severe depression    Diagnosis:    1. Major depressive disorder, recurrent episode, moderate with anxious distress ([facility] [address]    2. Panic disorder        Patient Active Problem List   Diagnosis    Chest pain    Dysmenorrhea    Herpes simplex virus (HSV) infection    Ventricular septal defect    Anxiety    Depression with anxiety         Plan:  Pt interventions:  Supportive techniques, Emphasized self-care as important for managing overall health, Cognitive strategies to target balanced thinking and Completed risk evaluation. Pt Behavioral Change Plan:  Pt set the following goals:  1. Practice being mindful - paying attention to the present moment on purpose and in a nonjudgmental way  2. Practice diaphragmatic breathing throughout the day. If you start to feel lightheaded, try shortening your in-breath (imagine oxygen is very expensive and you can only afford to take in a little bit) while lengthening your out-breath. 3. Practice grounding exercises - noticing what your senses are experiencing in the moment  4. Continue cutting out tobacco, marijuana, and caffeine  5. Continue doing yoga and other forms of physical activity such as walking  6. Read the list of positive coping statements and choose some to repeat to yourself when you start to feel panic symptoms  7. Consider journaling using an lanre like Day One  8.  Consider reading Mating in Captivity by Jessica Iglesias    Pt scheduled a F/U virtual visit.

## 2021-10-19 NOTE — PATIENT INSTRUCTIONS
Goals: 1. Practice being mindful - paying attention to the present moment on purpose and in a nonjudgmental way  2. Practice diaphragmatic breathing throughout the day. If you start to feel lightheaded, try shortening your in-breath (imagine oxygen is very expensive and you can only afford to take in a little bit) while lengthening your out-breath. 3. Practice grounding exercises - noticing what your senses are experiencing in the moment  4. Continue cutting out tobacco, marijuana, and caffeine  5. Continue doing yoga and other forms of physical activity such as walking  6. Read the list of positive coping statements and choose some to repeat to yourself when you start to feel panic symptoms  7. Consider journaling using an lanre like Day One  8.  Consider reading Mating in [place] by [employer]

## 2021-11-08 ENCOUNTER — VIRTUAL VISIT (OUTPATIENT)
Dept: PSYCHOLOGY | Age: 31
End: 2021-11-08
Payer: COMMERCIAL

## 2021-11-08 DIAGNOSIS — F41.0 PANIC DISORDER: ICD-10-CM

## 2021-11-08 DIAGNOSIS — F33.1 MAJOR DEPRESSIVE DISORDER, RECURRENT EPISODE, MODERATE WITH ANXIOUS DISTRESS (HCC): Primary | ICD-10-CM

## 2021-11-08 PROCEDURE — 90832 PSYTX W PT 30 MINUTES: CPT | Performed by: PSYCHOLOGIST

## 2021-11-08 NOTE — PATIENT INSTRUCTIONS
Goals: 1. Practice being mindful - paying attention to the present moment on purpose and in a nonjudgmental way  2. Practice diaphragmatic breathing throughout the day. If you start to feel lightheaded, try shortening your in-breath (imagine oxygen is very expensive and you can only afford to take in a little bit) while lengthening your out-breath. 3. Practice grounding exercises - noticing what your senses are experiencing in the moment  4. Continue cutting out tobacco, marijuana, and caffeine  5. Continue doing yoga and other forms of physical activity such as walking  6. Read the list of positive coping statements and choose some to repeat to yourself when you start to feel panic symptoms  7. Consider journaling using an lanre like Day One  8. Consider reading Mating in Captivity by Jessica Iglesias  9. Contact [employer] for career coaching ([email or web address]) at [phone]

## 2021-11-08 NOTE — PROGRESS NOTES
Behavioral Health Consultation  Trent Acuña Psy.D. Psychologist  11/8/2021  12-12:30 PM      Time spent with Patient: 30 minutes  This is patient's seventh [facility] appointment. Reason for Consult: Panic attacks  Referring Provider: Alexia Montesinos MD  [facility]    TELEHEALTH VISIT -- Audio/Visual (During ONPLQ-24 public health emergency)  }  Pursuant to the emergency declaration under the [address] [place] [address], [facility] waiver authority and the [employer] Act, this Virtual Visit was conducted, with patient's consent, to reduce the patient's risk of exposure to COVID-19 and provide continuity of care for an established patient. Services were provided through a video synchronous discussion virtually to substitute for in-person clinic visit. Pt gave verbal informed consent to participate in telehealth services. Conducted a risk-benefit analysis and determined that the patient's presenting problems are consistent with the use of telepsychology. Determined that the patient has sufficient knowledge and skills in the use of technology enabling them to adequately benefit from telepsychology. It was determined that this patient was able to be properly treated without an in-person session. Patient verified that they were currently located at the address that was provided during registration.     Verified the following information:  Patient's identification: Yes  Patient location: [address] [place] [address]  Patient's call back number: [phone]  Patient's emergency contact's name and number, as well as permission to contact them if needed: Primary is  Jessy Rao [phone]  Extended Emergency Contact Information  Primary Emergency Contact: [facility]  Address: [address] [place] [address] Phone: [phone]  Mobile Phone: [phone]  Relation: Parent  Secondary Emergency Contact: Kurt Dye  Address: [address] Phone: [phone]  Mobile Phone: [phone]  Relation: Spouse  Provider location: [place], [place]      S:  Pt shared about recent distress related to not having an income. Not feeling as well being alone with her thoughts and without a routine. Wishes her heart would stop beating. No plans or intent to harm herself. She is open now with her  about her feelings. 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 Planning more time with friends to occupy herself. Considering a day treatment program after the holidays. O:  Interventions:  -Supportive techniques  -Processed recent stressors and concerns  -Reinforced her efforts towards self-care  -Recommended [facility] career coaching services  -Explored feelings of loss and anger about her previous employment situation and her community there  -Conducted risk assessment. Appropriate for outpatient / telehealth care at this time.  -Continued encouraging pt to seek a higher level of care      A:  MSE:  Appearance: good hygiene  and appropriate attire  Attitude: cooperative, friendly and mild distress  Consciousness: alert  Orientation: oriented to person, place, time, general circumstance  Memory: recent and remote memory intact  Attention/Concentration: intact during session  Psychomotor Activity: normal  Eye Contact: normal  Speech: normal rate and volume, well-articulated  Mood: dysphoric, anxious  Affect: congruent  Perception: within normal limits  Thought Content: within normal limits  Thought Process: logical, coherent and goal-directed  Insight: good  Judgment: intact  Ability to understand instructions: Yes  Ability to respond meaningfully: Yes  Morbid Ideation: no   Suicide Assessment: no suicidal ideation, plan, or intent. Appropriate for outpatient / telehealth care at this time.   Homicidal Ideation: no    Safety: No imminent risk of danger to self/others based on the factors considered below. Appropriate for outpatient level of care. Safety plan includes: 911, PES, hotlines, and interventions discussed today. Risk factors: Depressed mood, occasional suicidal ideation, alcohol abuse  Protective factors: Age >24 and <55, female gender, denies current suicidal ideation, does not have access to guns, patient is brittany for safety, no prior suicide attempts, no family h/o suicide, patient has social or family support, no active psychosis or cognitive dysfunction, physically healthy, already has outpatient services in place, compliant with recommended medications, and patient is future-oriented. LALITA 7 SCORE 3/4/2020 2/5/2020   LALITA-7 Total Score 3 5     Interpretation of LALITA-7 score: 5-9 = mild anxiety, 10-14 = moderate anxiety, 15+ = severe anxiety. Recommend referral to behavioral health for scores 10 or greater. PHQ Scores 3/4/2020 2/5/2020 1/9/2020   PHQ2 Score 0 0 0   PHQ9 Score 4 4 0     Interpretation of Total Score Depression Severity: 1-4 = Minimal depression, 5-9 = Mild depression, 10-14 = Moderate depression, 15-19 = Moderately severe depression, 20-27 = Severe depression    Diagnosis:    1. Major depressive disorder, recurrent episode, moderate with anxious distress ([address]    2. Panic disorder        Patient Active Problem List   Diagnosis    Chest pain    Dysmenorrhea    Herpes simplex virus (HSV) infection    Ventricular septal defect    Anxiety    Depression with anxiety         Plan:  Pt interventions:  Supportive techniques, Provided Psychoeducation re: see above, Emphasized self-care as important for managing overall health, Cognitive strategies to target balanced thinking, Completed risk evaluation and Discussed benefits of referral for specialty care for day treatment program.      Pt Behavioral Change Plan:  Pt set the following goals:  1.  Practice being mindful - paying attention to the present moment on purpose and in a nonjudgmental way  2. Practice diaphragmatic breathing throughout the day. If you start to feel lightheaded, try shortening your in-breath (imagine oxygen is very expensive and you can only afford to take in a little bit) while lengthening your out-breath. 3. Practice grounding exercises - noticing what your senses are experiencing in the moment  4. Continue cutting out tobacco, marijuana, and caffeine  5. Continue doing yoga and other forms of physical activity such as walking  6. Read the list of positive coping statements and choose some to repeat to yourself when you start to feel panic symptoms  7. Consider journaling using an lanre like Day One  8. Consider reading Mating in Captivity by Jessica Iglesias  9. Contact [employer] for career coaching ([email or web address]) at [phone]    Pt scheduled a F/U virtual visit.

## 2021-11-10 ENCOUNTER — VIRTUAL VISIT (OUTPATIENT)
Dept: PSYCHOLOGY | Age: 31
End: 2021-11-10
Payer: COMMERCIAL

## 2021-11-10 ENCOUNTER — OFFICE VISIT (OUTPATIENT)
Dept: FAMILY MEDICINE CLINIC | Age: 31
End: 2021-11-10
Payer: COMMERCIAL

## 2021-11-10 VITALS
DIASTOLIC BLOOD PRESSURE: 76 MMHG | TEMPERATURE: 96.8 F | BODY MASS INDEX: 20.13 KG/M2 | WEIGHT: 109.4 LBS | OXYGEN SATURATION: 98 % | SYSTOLIC BLOOD PRESSURE: 92 MMHG | HEART RATE: 105 BPM | RESPIRATION RATE: 20 BRPM | HEIGHT: 62 IN

## 2021-11-10 DIAGNOSIS — F41.8 DEPRESSION WITH ANXIETY: Primary | ICD-10-CM

## 2021-11-10 DIAGNOSIS — F33.1 MAJOR DEPRESSIVE DISORDER, RECURRENT EPISODE, MODERATE WITH ANXIOUS DISTRESS (HCC): Primary | ICD-10-CM

## 2021-11-10 DIAGNOSIS — F41.0 PANIC DISORDER: ICD-10-CM

## 2021-11-10 PROCEDURE — 90832 PSYTX W PT 30 MINUTES: CPT | Performed by: PSYCHOLOGIST

## 2021-11-10 PROCEDURE — 99214 OFFICE O/P EST MOD 30 MIN: CPT | Performed by: FAMILY MEDICINE

## 2021-11-10 RX ORDER — ESCITALOPRAM OXALATE 10 MG/1
10 TABLET ORAL DAILY
Qty: 30 TABLET | Refills: 2 | Status: SHIPPED | OUTPATIENT
Start: 2021-11-10 | End: 2021-12-06

## 2021-11-10 ASSESSMENT — PATIENT HEALTH QUESTIONNAIRE - PHQ9
9. THOUGHTS THAT YOU WOULD BE BETTER OFF DEAD, OR OF HURTING YOURSELF: 1
1. LITTLE INTEREST OR PLEASURE IN DOING THINGS: 3
2. FEELING DOWN, DEPRESSED OR HOPELESS: 3
SUM OF ALL RESPONSES TO PHQ QUESTIONS 1-9: 12
SUM OF ALL RESPONSES TO PHQ QUESTIONS 1-9: 12
8. MOVING OR SPEAKING SO SLOWLY THAT OTHER PEOPLE COULD HAVE NOTICED. OR THE OPPOSITE, BEING SO FIGETY OR RESTLESS THAT YOU HAVE BEEN MOVING AROUND A LOT MORE THAN USUAL: 0
3. TROUBLE FALLING OR STAYING ASLEEP: 0
6. FEELING BAD ABOUT YOURSELF - OR THAT YOU ARE A FAILURE OR HAVE LET YOURSELF OR YOUR FAMILY DOWN: 0
SUM OF ALL RESPONSES TO PHQ9 QUESTIONS 1 & 2: 6
10. IF YOU CHECKED OFF ANY PROBLEMS, HOW DIFFICULT HAVE THESE PROBLEMS MADE IT FOR YOU TO DO YOUR WORK, TAKE CARE OF THINGS AT HOME, OR GET ALONG WITH OTHER PEOPLE: 1
5. POOR APPETITE OR OVEREATING: 1
4. FEELING TIRED OR HAVING LITTLE ENERGY: 3
7. TROUBLE CONCENTRATING ON THINGS, SUCH AS READING THE NEWSPAPER OR WATCHING TELEVISION: 1
SUM OF ALL RESPONSES TO PHQ QUESTIONS 1-9: 11

## 2021-11-10 ASSESSMENT — COLUMBIA-SUICIDE SEVERITY RATING SCALE - C-SSRS
4. HAVE YOU HAD THESE THOUGHTS AND HAD SOME INTENTION OF ACTING ON THEM?: NO
1. WITHIN THE PAST MONTH, HAVE YOU WISHED YOU WERE DEAD OR WISHED YOU COULD GO TO SLEEP AND NOT WAKE UP?: YES
3. HAVE YOU BEEN THINKING ABOUT HOW YOU MIGHT KILL YOURSELF?: YES
5. HAVE YOU STARTED TO WORK OUT OR WORKED OUT THE DETAILS OF HOW TO KILL YOURSELF? DO YOU INTEND TO CARRY OUT THIS PLAN?: NO
6. HAVE YOU EVER DONE ANYTHING, STARTED TO DO ANYTHING, OR PREPARED TO DO ANYTHING TO END YOUR LIFE?: NO
2. HAVE YOU ACTUALLY HAD ANY THOUGHTS OF KILLING YOURSELF?: YES

## 2021-11-10 NOTE — PROGRESS NOTES
Behavioral Health Consultation  Ellen Rowe Psy.D. Psychologist  11/10/2021  1:30-2 PM      Time spent with Patient: 30 minutes  This is patient's eighth [facility] appointment. Reason for Consult: Panic attacks  Referring Provider: Jaylyn Pak MD  [facility]    TELEHEALTH VISIT -- Audio/Visual (During SGZEV-56 public health emergency)  }  Pursuant to the emergency declaration under the [address] [place] [address], [facility] waiver authority and the [employer] Act, this Virtual Visit was conducted, with patient's consent, to reduce the patient's risk of exposure to COVID-19 and provide continuity of care for an established patient. Services were provided through a video synchronous discussion virtually to substitute for in-person clinic visit. Pt gave verbal informed consent to participate in telehealth services. Conducted a risk-benefit analysis and determined that the patient's presenting problems are consistent with the use of telepsychology. Determined that the patient has sufficient knowledge and skills in the use of technology enabling them to adequately benefit from telepsychology. It was determined that this patient was able to be properly treated without an in-person session. Patient verified that they were currently located at the address that was provided during registration.     Verified the following information:  Patient's identification: Yes  Patient location: [address] [place] [address]  Patient's call back number: [phone]  Patient's emergency contact's name and number, as well as permission to contact them if needed: Primary is  Maureen Elise [phone]  Extended Emergency Contact Information  Primary Emergency Contact: [facility]  Address: [address] [place] [address] Phone: [phone]  Mobile Phone: imminent risk of danger to self/others based on the factors considered below. Appropriate for outpatient level of care. Safety plan includes: 911, PES, hotlines, and interventions discussed today. Risk factors: Depressed mood, occasional suicidal ideation, alcohol abuse  Protective factors: Age >24 and <55, female gender, denies current suicidal ideation, does not have access to guns, patient is brittany for safety, no prior suicide attempts, no family h/o suicide, patient has social or family support, no active psychosis or cognitive dysfunction, physically healthy, already has outpatient services in place, compliant with recommended medications, and patient is future-oriented. LALITA 7 SCORE 3/4/2020 2/5/2020   LALITA-7 Total Score 3 5     Interpretation of LALITA-7 score: 5-9 = mild anxiety, 10-14 = moderate anxiety, 15+ = severe anxiety. Recommend referral to behavioral health for scores 10 or greater. PHQ Scores 11/10/2021 3/4/2020 2/5/2020 1/9/2020   PHQ2 Score 6 0 0 0   PHQ9 Score 12 4 4 0     Interpretation of Total Score Depression Severity: 1-4 = Minimal depression, 5-9 = Mild depression, 10-14 = Moderate depression, 15-19 = Moderately severe depression, 20-27 = Severe depression    Diagnosis:    1. Major depressive disorder, recurrent episode, moderate with anxious distress ([address]    2. Panic disorder        Patient Active Problem List   Diagnosis    Chest pain    Dysmenorrhea    Herpes simplex virus (HSV) infection    Ventricular septal defect    Anxiety    Depression with anxiety         Plan:  Pt interventions:  Supportive techniques, Provided Psychoeducation re: see above, Emphasized self-care as important for managing overall health, Cognitive strategies to target balanced thinking, Discussed psychotropic medications and Completed risk evaluation. Pt Behavioral Change Plan:  Pt set the following goals:  1.  Practice being mindful - paying attention to the present moment on purpose and in a nonjudgmental way  2. Practice diaphragmatic breathing throughout the day. If you start to feel lightheaded, try shortening your in-breath (imagine oxygen is very expensive and you can only afford to take in a little bit) while lengthening your out-breath. 3. Practice grounding exercises - noticing what your senses are experiencing in the moment  4. Continue cutting out tobacco, marijuana, and caffeine  5. Continue doing yoga and other forms of physical activity such as walking  6. Read the list of positive coping statements and choose some to repeat to yourself when you start to feel panic symptoms  7. Consider journaling using an lanre like Day One  8. Consider reading Mating in Captivity by Jessica Iglesais  9. Contact [employer]) for career coaching ([email or web address]) at [phone]    Pt scheduled a F/U virtual visit.

## 2021-11-10 NOTE — PATIENT INSTRUCTIONS
Goals: 1. Practice being mindful - paying attention to the present moment on purpose and in a nonjudgmental way  2. Practice diaphragmatic breathing throughout the day. If you start to feel lightheaded, try shortening your in-breath (imagine oxygen is very expensive and you can only afford to take in a little bit) while lengthening your out-breath. 3. Practice grounding exercises - noticing what your senses are experiencing in the moment  4. Continue cutting out tobacco, marijuana, and caffeine  5. Continue doing yoga and other forms of physical activity such as walking  6. Read the list of positive coping statements and choose some to repeat to yourself when you start to feel panic symptoms  7. Consider journaling using an lanre like Day One  8. Consider reading Mating in Captivity by Jessica Iglesias  9. Contact [employer]) for career coaching ([email or web address]) at [phone]

## 2021-11-10 NOTE — PROGRESS NOTES
Patient is here for follow up of depression and anxiety . She quit her job at [employer] about 1 month ago. It seemed to make her depression worse. She will be starting at [employer] in [address] [place] [address] and working 5 am to noon or 1 pm.  No suicidal or homicidal ideation. No plan. She does better when she is with people. When she was not working , her mood was worse after 1-2 weeks off. She feels her future is dim. Mood is 5/10 . Relationships are okay . She and her significant other tried an open marriage , but it made him sad. It was mutual to stop the open marriage. Rosalinda Marin had been a bit of a stress, but that should get better with the new job. He is working and supportive. Admits to social affective disorder . She is seeing Dr. Bindu Aguillon q 2-4 weeks. She is at a better place now versus 1-2 weeks ago. Review of Systems    ROS: All other systems were reviewed and are negative . Patient's allergies and medications were reviewed. Patient's past medical, surgical, social , and family history were reviewed. OBJECTIVE:  BP 92/76   Pulse 105   Temp 96.8 °F (36 °C)   Resp 20   Ht 5' 1.5\" (1.562 m)   Wt 109 lb 6.4 oz (49.6 kg)   LMP 11/04/2021 (Exact Date)   SpO2 98%   BMI 20.34 kg/m²     Physical Exam    General: NAD, cooperative, alert and oriented X 3. Mood / affect is good. good insight. well hydrated. Neck : no lymphadenopathy, supple, FROM  CV: Regular rate and rhythm , no murmurs/ rub/ gallop. No edema. Lungs : CTA bilaterally, breathing comfortably  Abdomen: positive bowel sounds, soft , non tender, non distended. No hepatosplenomegaly. No CVA tenderness. Skin: no rashes. Non tender. ASSESSMENT/  PLAN:  1. Depression with anxiety  - Restart Escitalopram (LEXAPRO) 10 MG tablet; Take 1 tablet by mouth daily  Dispense: 30 tablet; Refill: 2. Medication discussed, including use , risks, side effects and benefits. Patient voiced understanding and agrees with use. Barriers to medication compliance addressed. All the patient's questions were addressed. - Discussed tapering up on dose with 5 mg qd X 1-2 weeks then 10 mg qd. - Continue counseling. Follow up if no improvement in  4 weeks/ as needed for increased symptoms.

## 2021-12-01 ENCOUNTER — VIRTUAL VISIT (OUTPATIENT)
Dept: PSYCHOLOGY | Age: 31
End: 2021-12-01
Payer: COMMERCIAL

## 2021-12-01 DIAGNOSIS — F33.1 MAJOR DEPRESSIVE DISORDER, RECURRENT EPISODE, MODERATE WITH ANXIOUS DISTRESS (HCC): Primary | ICD-10-CM

## 2021-12-01 DIAGNOSIS — F41.0 PANIC DISORDER: ICD-10-CM

## 2021-12-01 PROCEDURE — 90832 PSYTX W PT 30 MINUTES: CPT | Performed by: PSYCHOLOGIST

## 2021-12-01 NOTE — PATIENT INSTRUCTIONS
Goals: 1. Practice being mindful - paying attention to the present moment on purpose and in a nonjudgmental way  2. Practice diaphragmatic breathing throughout the day. If you start to feel lightheaded, try shortening your in-breath (imagine oxygen is very expensive and you can only afford to take in a little bit) while lengthening your out-breath. 3. Practice grounding exercises - noticing what your senses are experiencing in the moment  4. Continue cutting out tobacco, marijuana, and caffeine  5. Continue doing yoga and other forms of physical activity such as walking  6. Read the list of positive coping statements and choose some to repeat to yourself when you start to feel panic symptoms  7. Consider journaling using an lanre like Day One  8. Consider reading Mating in Captivity by Jessica Iglesias  9. Contact [employer] ([employer]) for career coaching ([email or web address]) at [phone]

## 2021-12-01 NOTE — PROGRESS NOTES
Behavioral Health Consultation  Loyda Castro Psy.D. Psychologist  12/1/2021  2:30-3 PM      Time spent with Patient: 30 minutes  This is patient's ninth [facility] appointment. Reason for Consult: Panic attacks  Referring Provider: Adelaide Phillips MD  [facility]    TELEHEALTH VISIT -- Audio/Visual (During GXOKE-28 public health emergency)  }  Pursuant to the emergency declaration under the [address] [place] [address], [facility] waiver authority and the [employer] Act, this Virtual Visit was conducted, with patient's consent, to reduce the patient's risk of exposure to COVID-19 and provide continuity of care for an established patient. Services were provided through a video synchronous discussion virtually to substitute for in-person clinic visit. Pt gave verbal informed consent to participate in telehealth services. Conducted a risk-benefit analysis and determined that the patient's presenting problems are consistent with the use of telepsychology. Determined that the patient has sufficient knowledge and skills in the use of technology enabling them to adequately benefit from telepsychology. It was determined that this patient was able to be properly treated without an in-person session. Patient verified that they were currently located at the address that was provided during registration.     Verified the following information:  Patient's identification: Yes  Patient location: [address] [place] [address]  Patient's call back number: [phone]  Patient's emergency contact's name and number, as well as permission to contact them if needed: Primary is martha Cross [phone]  Extended Emergency Contact Information  Primary Emergency Contact: [facility]  Address: [address] [place] [address] Phone: [phone]  Mobile Phone: [phone]  Relation: Parent  Secondary Emergency Contact: Kurt Dye  Address: [address] [facility]  Phone: [phone]  Mobile Phone: [phone]  Relation: Spouse  Provider location: Alfred Calderón:  Pt started her new job, which is fine for now. Hard to wake up early; not getting enough sleep so napping more. Discussed relationship concerns. Stopped taking Lexapro for a week, plans to restart it. O:  Interventions:  -Supportive techniques  -Processed recent and past stressors and concerns  -Reinforced her efforts towards self-care  -Explored relationship concerns and communication  -Discussed medication regimen. Encouraged improved compliance. A:  MSE:  Appearance: good hygiene  and appropriate attire  Attitude: cooperative, friendly and mild distress  Consciousness: alert  Orientation: oriented to person, place, time, general circumstance  Memory: recent and remote memory intact  Attention/Concentration: intact during session  Psychomotor Activity: normal  Eye Contact: normal  Speech: normal rate and volume, well-articulated  Mood: dysphoric, anxious  Affect: congruent  Perception: within normal limits  Thought Content: within normal limits  Thought Process: logical, coherent and goal-directed  Insight: good  Judgment: intact  Ability to understand instructions: Yes  Ability to respond meaningfully: Yes  Morbid Ideation: no   Suicide Assessment: no suicidal ideation, plan, or intent. Appropriate for outpatient / telehealth care at this time. Homicidal Ideation: no    Safety: No imminent risk of danger to self/others based on the factors considered below. Appropriate for outpatient level of care. Safety plan includes: 911, PES, hotlines, and interventions discussed today.    Risk factors: Depressed mood, occasional suicidal ideation, alcohol abuse  Protective factors: Age >24 and <55, female gender, denies current suicidal ideation, does not have access to guns, patient is brittany for safety, no prior suicide attempts, no family h/o suicide, patient has social or family support, no active psychosis or cognitive dysfunction, physically healthy, already has outpatient services in place, compliant with recommended medications, and patient is future-oriented. LALITA 7 SCORE 3/4/2020 2/5/2020   LALITA-7 Total Score 3 5     Interpretation of LALITA-7 score: 5-9 = mild anxiety, 10-14 = moderate anxiety, 15+ = severe anxiety. Recommend referral to behavioral health for scores 10 or greater. PHQ Scores 11/10/2021 3/4/2020 2/5/2020 1/9/2020   PHQ2 Score 6 0 0 0   PHQ9 Score 12 4 4 0     Interpretation of Total Score Depression Severity: 1-4 = Minimal depression, 5-9 = Mild depression, 10-14 = Moderate depression, 15-19 = Moderately severe depression, 20-27 = Severe depression    Diagnosis:    1. Major depressive disorder, recurrent episode, moderate with anxious distress ([facility] [address] [facility]    2. Panic disorder        Patient Active Problem List   Diagnosis    Chest pain    Dysmenorrhea    Herpes simplex virus (HSV) infection    Ventricular septal defect    Anxiety    Depression with anxiety         Plan:  Pt interventions:  Supportive techniques, Provided Psychoeducation re: see above, Emphasized self-care as important for managing overall health, Cognitive strategies to target balanced thinking, Discussed psychotropic medications and Completed risk evaluation. Pt Behavioral Change Plan:  Pt set the following goals:  1. Practice being mindful - paying attention to the present moment on purpose and in a nonjudgmental way  2. Practice diaphragmatic breathing throughout the day. If you start to feel lightheaded, try shortening your in-breath (imagine oxygen is very expensive and you can only afford to take in a little bit) while lengthening your out-breath.   3. Practice grounding exercises - noticing what your senses are experiencing in the moment  4. Continue cutting out tobacco, marijuana, and caffeine  5. Continue doing yoga and other forms of physical activity such as walking  6. Read the list of positive coping statements and choose some to repeat to yourself when you start to feel panic symptoms  7. Consider journaling using an lanre like Day One  8. Consider reading Mating in Captivity by Jessica Iglesias  9. Contact [employer]) for career coaching ([email or web address]) at [phone]    Pt scheduled a F/U virtual visit.

## 2021-12-04 DIAGNOSIS — F41.8 DEPRESSION WITH ANXIETY: ICD-10-CM

## 2021-12-06 RX ORDER — ESCITALOPRAM OXALATE 10 MG/1
TABLET ORAL
Qty: 30 TABLET | Refills: 2 | Status: SHIPPED | OUTPATIENT
Start: 2021-12-06

## 2021-12-06 NOTE — TELEPHONE ENCOUNTER
Requested Prescriptions     Pending Prescriptions Disp Refills    escitalopram (LEXAPRO) 10 MG tablet [Pharmacy Med Name: ESCITALOPRAM 10 MG TABLET] 30 tablet 2     Sig: TAKE 1 TABLET BY MOUTH EVERY DAY     11/10/2021  Last ov 11/10/2021

## 2022-01-04 ENCOUNTER — VIRTUAL VISIT (OUTPATIENT)
Dept: PSYCHOLOGY | Age: 32
End: 2022-01-04
Payer: COMMERCIAL

## 2022-01-04 DIAGNOSIS — F33.1 MAJOR DEPRESSIVE DISORDER, RECURRENT EPISODE, MODERATE WITH ANXIOUS DISTRESS (HCC): Primary | ICD-10-CM

## 2022-01-04 DIAGNOSIS — F41.0 PANIC DISORDER: ICD-10-CM

## 2022-01-04 PROCEDURE — 90832 PSYTX W PT 30 MINUTES: CPT | Performed by: PSYCHOLOGIST

## 2022-01-04 NOTE — PATIENT INSTRUCTIONS
Goals: 1. Practice being mindful - paying attention to the present moment on purpose and in a nonjudgmental way  2. Practice diaphragmatic breathing throughout the day. If you start to feel lightheaded, try shortening your in-breath (imagine oxygen is very expensive and you can only afford to take in a little bit) while lengthening your out-breath. 3. Practice grounding exercises - noticing what your senses are experiencing in the moment  4. Continue cutting out tobacco, marijuana, and caffeine  5. Continue doing yoga and other forms of physical activity such as walking  6. Read the list of positive coping statements and choose some to repeat to yourself when you start to feel panic symptoms  7. Consider journaling using an lanre like Day One  8. Consider reading Mating in Captivity by Jessica Iglesias  9. Contact [employer] ([employer]) for career coaching ([email or web address]) at [phone]

## 2022-01-04 NOTE — PROGRESS NOTES
[phone]  Relation: Parent  Secondary Emergency Contact: Kurt Dye  Address: [address] [facility] [address] Phone: [phone]  Mobile Phone: [phone]  Relation: Spouse  Provider location: [place], [place]      S:  Pt has been doing okay. She has been busy working. Looking forward to her job at [employer] ending in a couple weeks. 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 Saving up for a car. Discussed her marriage. Woke up in a panic in the last 24 hours but otherwise has been feeling well for awhile. No recent SI, plan or intent. O:  Interventions:  -Supportive techniques  -Processed recent and past stressors and concerns  -Reinforced her efforts towards self-care  -Discussed her marriage  -Conducted risk assessment. Appropriate for outpatient / telehealth care at this time. A:  MSE:  Appearance: good hygiene  and appropriate attire  Attitude: cooperative, friendly and mild distress  Consciousness: alert  Orientation: oriented to person, place, time, general circumstance  Memory: recent and remote memory intact  Attention/Concentration: intact during session  Psychomotor Activity: normal  Eye Contact: normal  Speech: normal rate and volume, well-articulated  Mood: fatigued, mildly dysphoric and anxious  Affect: congruent  Perception: within normal limits  Thought Content: within normal limits  Thought Process: logical, coherent and goal-directed  Insight: good  Judgment: intact  Ability to understand instructions: Yes  Ability to respond meaningfully: Yes  Morbid Ideation: no   Suicide Assessment: no suicidal ideation, plan, or intent. Appropriate for outpatient / telehealth care at this time. Homicidal Ideation: no    Safety: No imminent risk of danger to self/others based on the factors considered below. Appropriate for outpatient level of care. Safety plan includes: 911, PES, hotlines, and interventions discussed today.    Risk factors: Depressed mood, historic suicidal ideation, alcohol abuse  Protective factors: Age >24 and <55, female gender, denies current suicidal ideation, does not have access to guns, patient is brittany for safety, no prior suicide attempts, no family h/o suicide, patient has social or family support, no active psychosis or cognitive dysfunction, physically healthy, already has outpatient services in place, compliant with recommended medications, and patient is future-oriented. LALITA 7 SCORE 3/4/2020 2/5/2020   LALITA-7 Total Score 3 5     Interpretation of LALITA-7 score: 5-9 = mild anxiety, 10-14 = moderate anxiety, 15+ = severe anxiety. Recommend referral to behavioral health for scores 10 or greater. PHQ Scores 11/10/2021 3/4/2020 2/5/2020 1/9/2020   PHQ2 Score 6 0 0 0   PHQ9 Score 12 4 4 0     Interpretation of Total Score Depression Severity: 1-4 = Minimal depression, 5-9 = Mild depression, 10-14 = Moderate depression, 15-19 = Moderately severe depression, 20-27 = Severe depression    Diagnosis:    1. Major depressive disorder, recurrent episode, moderate with anxious distress ([facility] [address]    2. Panic disorder        Patient Active Problem List   Diagnosis    Chest pain    Dysmenorrhea    Herpes simplex virus (HSV) infection    Ventricular septal defect    Anxiety    Depression with anxiety         Plan:  Pt interventions:  Supportive techniques, Emphasized self-care as important for managing overall health, Cognitive strategies to target balanced thinking and Completed risk evaluation. Pt Behavioral Change Plan:  Pt set the following goals:  1. Practice being mindful - paying attention to the present moment on purpose and in a nonjudgmental way  2. Practice diaphragmatic breathing throughout the day. If you start to feel lightheaded, try shortening your in-breath (imagine oxygen is very expensive and you can only afford to take in a little bit) while lengthening your out-breath.   3. Practice grounding exercises - noticing what your senses are experiencing in the moment  4. Continue cutting out tobacco, marijuana, and caffeine  5. Continue doing yoga and other forms of physical activity such as walking  6. Read the list of positive coping statements and choose some to repeat to yourself when you start to feel panic symptoms  7. Consider journaling using an lanre like Day One  8. Consider reading Mating in Captivity by Jessica Iglesias  9. Contact [employer]) for career coaching ([email or web address]) at [phone]    Pt scheduled a F/U virtual visit.

## 2022-01-31 ENCOUNTER — VIRTUAL VISIT (OUTPATIENT)
Dept: PSYCHOLOGY | Age: 32
End: 2022-01-31
Payer: COMMERCIAL

## 2022-01-31 DIAGNOSIS — F41.0 PANIC DISORDER: ICD-10-CM

## 2022-01-31 DIAGNOSIS — F33.1 MAJOR DEPRESSIVE DISORDER, RECURRENT EPISODE, MODERATE WITH ANXIOUS DISTRESS (HCC): Primary | ICD-10-CM

## 2022-01-31 PROCEDURE — 90832 PSYTX W PT 30 MINUTES: CPT | Performed by: PSYCHOLOGIST

## 2022-01-31 NOTE — PATIENT INSTRUCTIONS
Goals: 1. Practice being mindful - paying attention to the present moment on purpose and in a nonjudgmental way  2. Practice diaphragmatic breathing throughout the day. If you start to feel lightheaded, try shortening your in-breath (imagine oxygen is very expensive and you can only afford to take in a little bit) while lengthening your out-breath. 3. Practice grounding exercises - noticing what your senses are experiencing in the moment  4. Continue cutting out tobacco, marijuana, and caffeine  5. Continue doing yoga and other forms of physical activity such as walking  6. Read the list of positive coping statements and choose some to repeat to yourself when you start to feel panic symptoms  7. Consider journaling using an lanre like Day One  8. Consider reading Mating in Captivity by Jessica Iglesias  9. Contact [employer] ([employer]) for career coaching ([email or web address]) at [phone]

## 2022-01-31 NOTE — PROGRESS NOTES
Behavioral Health Consultation  Joellen Stoner Psy.D. Psychologist  2022  12-12:30 PM      Time spent with Patient: 30 minutes  This is patient's twelfth [facility] appointment. Reason for Consult: Panic attacks  Referring Provider: Ladarius Dolan MD  [address] [facility]    TELEHEALTH VISIT -- Audio/Visual (During UXWNW-07 public health emergency)  }  Pursuant to the emergency declaration under the [address] [place] [address] [facility] authority and the [employer] Act, this Virtual Visit was conducted, with patient's consent, to reduce the patient's risk of exposure to COVID-19 and provide continuity of care for an established patient. Services were provided through a video synchronous discussion virtually to substitute for in-person clinic visit. Pt gave verbal informed consent to participate in telehealth services. Conducted a risk-benefit analysis and determined that the patient's presenting problems are consistent with the use of telepsychology. Determined that the patient has sufficient knowledge and skills in the use of technology enabling them to adequately benefit from telepsychology. It was determined that this patient was able to be properly treated without an in-person session. Patient verified that they were currently located at the address that was provided during registration.     Verified the following information:  Patient's identification: Yes  Patient location: [address] [place] [address]  Patient's call back number: [phone]  Patient's emergency contact's name and number, as well as permission to contact them if needed: Primary is  Jemima Sharma [phone]  Extended Emergency Contact Information  Primary Emergency Contact: [facility]  Address: [address] [place] [address] Veronica Dawkins[address] Phone: [phone]  Mobile Phone: gender, denies current suicidal ideation, does not have access to guns, patient is brittany for safety, no prior suicide attempts, no family h/o suicide, patient has social or family support, no active psychosis or cognitive dysfunction, physically healthy, already has outpatient services in place, compliant with recommended medications, and patient is future-oriented. LALITA 7 SCORE 3/4/2020 2/5/2020   LALITA-7 Total Score 3 5     Interpretation of LALITA-7 score: 5-9 = mild anxiety, 10-14 = moderate anxiety, 15+ = severe anxiety. Recommend referral to behavioral health for scores 10 or greater. PHQ Scores 11/10/2021 3/4/2020 2/5/2020 1/9/2020   PHQ2 Score 6 0 0 0   PHQ9 Score 12 4 4 0     Interpretation of Total Score Depression Severity: 1-4 = Minimal depression, 5-9 = Mild depression, 10-14 = Moderate depression, 15-19 = Moderately severe depression, 20-27 = Severe depression    Diagnosis:    1. Major depressive disorder, recurrent episode, moderate with anxious distress ([facility] [address]    2. Panic disorder        Patient Active Problem List   Diagnosis    Chest pain    Dysmenorrhea    Herpes simplex virus (HSV) infection    Ventricular septal defect    Anxiety    Depression with anxiety         Plan:  Pt interventions:  Supportive techniques, Emphasized self-care as important for managing overall health, Cognitive strategies to target balanced thinking and Completed risk evaluation. Pt Behavioral Change Plan:  Pt set the following goals:  1. Practice being mindful - paying attention to the present moment on purpose and in a nonjudgmental way  2. Practice diaphragmatic breathing throughout the day. If you start to feel lightheaded, try shortening your in-breath (imagine oxygen is very expensive and you can only afford to take in a little bit) while lengthening your out-breath. 3. Practice grounding exercises - noticing what your senses are experiencing in the moment  4.  Continue cutting out tobacco, marijuana, and caffeine  5. Continue doing yoga and other forms of physical activity such as walking  6. Read the list of positive coping statements and choose some to repeat to yourself when you start to feel panic symptoms  7. Consider journaling using an lanre like Day One  8. Consider reading Mating in Captivity by Jessica Iglesias  9. Contact [employer] ([employer] for career coaching ([email or web address] ([email or web address]). org) at ([phone]    Pt scheduled a F/U virtual visit.

## 2022-02-18 ENCOUNTER — TELEMEDICINE (OUTPATIENT)
Dept: PSYCHOLOGY | Age: 32
End: 2022-02-18
Payer: COMMERCIAL

## 2022-02-18 DIAGNOSIS — F41.0 PANIC DISORDER: ICD-10-CM

## 2022-02-18 DIAGNOSIS — F33.1 MAJOR DEPRESSIVE DISORDER, RECURRENT EPISODE, MODERATE WITH ANXIOUS DISTRESS (HCC): Primary | ICD-10-CM

## 2022-02-18 PROCEDURE — 90832 PSYTX W PT 30 MINUTES: CPT | Performed by: PSYCHOLOGIST

## 2022-02-18 NOTE — PATIENT INSTRUCTIONS
Goals: 1. Practice being mindful - paying attention to the present moment on purpose and in a nonjudgmental way  2. Practice diaphragmatic breathing throughout the day. If you start to feel lightheaded, try shortening your in-breath (imagine oxygen is very expensive and you can only afford to take in a little bit) while lengthening your out-breath. 3. Practice grounding exercises - noticing what your senses are experiencing in the moment  4. Continue cutting out tobacco, marijuana, and caffeine  5. Continue doing yoga and other forms of physical activity such as walking  6. Read the list of positive coping statements and choose some to repeat to yourself when you start to feel panic symptoms  7. Consider journaling using an lanre like Day One  8. Consider reading Mating in Captivity by Jessica Iglesias  9. Contact [employer] ([employer]) for career coaching (www.raksulcareers. org) at ([phone]

## 2022-02-18 NOTE — PROGRESS NOTES
Behavioral Health Consultation  Chelle Solano Psy.D. Psychologist  2/18/2022  11-11:30 AM      Time spent with Patient: 30 minutes  This is patient's thirteenth [facility] appointment. Reason for Consult: Panic attacks  Referring Provider: MD Kelvin Higgins [address] [place] [address]    TELEHEALTH VISIT -- Audio/Visual (During [facility] public health emergency)  }  Pursuant to the emergency declaration under the [address] [place] [address], [facility] authority and the [employer] Act, this Virtual Visit was conducted, with patient's consent, to reduce the patient's risk of exposure to COVID-19 and provide continuity of care for an established patient. Services were provided through a video synchronous discussion virtually to substitute for in-person clinic visit. Pt gave verbal informed consent to participate in telehealth services. Conducted a risk-benefit analysis and determined that the patient's presenting problems are consistent with the use of telepsychology. Determined that the patient has sufficient knowledge and skills in the use of technology enabling them to adequately benefit from telepsychology. It was determined that this patient was able to be properly treated without an in-person session. Patient verified that they were currently located at the address that was provided during registration.     Verified the following information:  Patient's identification: Yes  Patient location: [address] [place] [address]  Patient's call back number: [phone]  Patient's emergency contact's name and number, as well as permission to contact them if needed: Primary is mratha Dominguez [phone]  Extended Emergency Contact Information  Primary Emergency Contact: [facility]  Address: [address] [place] [address] Phone: [phone]  Mobile Phone: [phone]  Relation: Parent  Secondary Emergency Contact: Kurt Dye  Address: [address] [facility] [address] Phone: [phone]  Mobile Phone: [phone]  Relation: Spouse  Provider location: [place]:  Pt will start a new job serving at a [place] [employer]. Looking forward to starting soon. Spending time with friends. Went to [place] to get a tattoo. No recent panic attacks. Reflected on her marriage. O:  Interventions:  -Supportive techniques  -Processed recent and past stressors and concerns  -Reinforced her efforts towards self-care  -Explored relationship dynamics  -Recommended letter writing      A:  MSE:  Appearance: good hygiene  and appropriate attire  Attitude: cooperative, friendly and mild distress  Consciousness: alert  Orientation: oriented to person, place, time, general circumstance  Memory: recent and remote memory intact  Attention/Concentration: intact during session  Psychomotor Activity: normal  Eye Contact: normal  Speech: normal rate and volume, well-articulated  Mood: mildly dysphoric and anxious  Affect: congruent  Perception: within normal limits  Thought Content: within normal limits  Thought Process: logical, coherent and goal-directed  Insight: good  Judgment: intact  Ability to understand instructions: Yes  Ability to respond meaningfully: Yes  Morbid Ideation: no   Suicide Assessment: no suicidal ideation, plan, or intent. Appropriate for outpatient / telehealth care at this time. Homicidal Ideation: no    Safety: No imminent risk of danger to self/others based on the factors considered below. Appropriate for outpatient level of care. Safety plan includes: 911, PES, hotlines, and interventions discussed today.    Risk factors: Depressed mood, historic suicidal ideation, alcohol abuse  Protective factors: Age >24 and <55, female gender, denies current suicidal ideation, does not have access to guns, Read the list of positive coping statements and choose some to repeat to yourself when you start to feel panic symptoms  7. Consider journaling using an lanre like Day One  8. Consider reading Mating in Captivity by Jessica Iglesias  9. Contact [employer] ([employer]) for career coaching ([email or web address].[email or web address]) at [phone]    Pt scheduled a F/U virtual visit.

## 2022-03-09 ENCOUNTER — TELEPHONE (OUTPATIENT)
Dept: FAMILY MEDICINE CLINIC | Age: 32
End: 2022-03-09

## 2022-03-09 RX ORDER — AMOXICILLIN 500 MG/1
CAPSULE ORAL
Qty: 4 CAPSULE | Refills: 0 | Status: SHIPPED | OUTPATIENT
Start: 2022-03-09

## 2022-03-09 NOTE — TELEPHONE ENCOUNTER
Pt calling. Pt stated that she is to have some dental work done on Monday, cavity's filled, and Provider usually sends her in a preventative antibiotic.       [facility]

## 2022-03-14 ENCOUNTER — TELEMEDICINE (OUTPATIENT)
Dept: PSYCHOLOGY | Age: 32
End: 2022-03-14
Payer: COMMERCIAL

## 2022-03-14 DIAGNOSIS — F33.0 MAJOR DEPRESSIVE DISORDER, RECURRENT EPISODE, MILD WITH ANXIOUS DISTRESS (HCC): Primary | ICD-10-CM

## 2022-03-14 DIAGNOSIS — F41.0 PANIC DISORDER: ICD-10-CM

## 2022-03-14 PROCEDURE — 90832 PSYTX W PT 30 MINUTES: CPT | Performed by: PSYCHOLOGIST

## 2022-03-14 NOTE — PATIENT INSTRUCTIONS
Goals: 1. Practice being mindful - paying attention to the present moment on purpose and in a nonjudgmental way  2. Practice diaphragmatic breathing throughout the day. If you start to feel lightheaded, try shortening your in-breath (imagine oxygen is very expensive and you can only afford to take in a little bit) while lengthening your out-breath. 3. Practice grounding exercises - noticing what your senses are experiencing in the moment  4. Continue cutting out tobacco, marijuana, and caffeine  5. Continue doing yoga and other forms of physical activity such as walking  6. Read the list of positive coping statements and choose some to repeat to yourself when you start to feel panic symptoms  7. Consider journaling using an lanre like Day One  8. Consider reading Mating in Captivity by Jessica Iglesias  9. Contact [employer] ([employer] Ã¨â€¹Â±Ã¨â€¦Â¾Ã§Â½â€˜) for career coaching (www.[email or web address]. org) at [phone]

## 2022-03-14 NOTE — PROGRESS NOTES
Behavioral Health Consultation  Branden Bloom Psy.D. Psychologist  3/14/2022  10:30-11 AM      Time spent with Patient: 30 minutes  This is patient's fourteenth [facility] appointment. Reason for Consult: Panic attacks  Referring Provider: Deb Foster MD  1212 [facility]    TELEHEALTH VISIT -- Audio/Visual (During WLJUS-60 public health emergency)  }  Pursuant to the emergency declaration under the [address] [place] [address], [facility] waiver authority and the [facility] and [employer] Act, this Virtual Visit was conducted, with patient's consent, to reduce the patient's risk of exposure to COVID-19 and provide continuity of care for an established patient. Services were provided through a video synchronous discussion virtually to substitute for in-person clinic visit. Pt gave verbal informed consent to participate in telehealth services. Conducted a risk-benefit analysis and determined that the patient's presenting problems are consistent with the use of telepsychology. Determined that the patient has sufficient knowledge and skills in the use of technology enabling them to adequately benefit from telepsychology. It was determined that this patient was able to be properly treated without an in-person session. Patient verified that they were currently located at the address that was provided during registration.     Verified the following information:  Patient's identification: Yes  Patient location: [address] [place] [address]  Patient's call back number: [phone]  Patient's emergency contact's name and number, as well as permission to contact them if needed: Primary is  Quinton Garcia [phone]  Extended Emergency Contact Information  Primary Emergency Contact: [facility]  Address: [address] [place] [address] Phone: [phone]  Mobile Phone: [phone]  Relation: Parent  Secondary Emergency Contact: Kurt Dye  Address: [address] [facility] [address] Phone: [phone]  Mobile Phone: [phone]  Relation: Spouse  Provider location: [place]:  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 Pleased to have finished her tattoo. Benefiting from more sunshine. No recent panic attacks. Discussed boundary setting and expectations with her . Pt is focusing more on her own needs lately. O:  Interventions:  -Supportive techniques  -Processed recent and past stressors and concerns  -Reinforced her efforts towards self-care  -Explored relationship dynamics  -Discussed communication strategies. Engaged in role playing exercise. A:  MSE:  Appearance: good hygiene  and appropriate attire  Attitude: cooperative and friendly  Consciousness: alert  Orientation: oriented to person, place, time, general circumstance  Memory: recent and remote memory intact  Attention/Concentration: intact during session  Psychomotor Activity: normal  Eye Contact: normal  Speech: normal rate and volume, well-articulated  Mood: mildly anxious  Affect: congruent  Perception: within normal limits  Thought Content: within normal limits  Thought Process: logical, coherent and goal-directed  Insight: good  Judgment: intact  Ability to understand instructions: Yes  Ability to respond meaningfully: Yes  Morbid Ideation: no   Suicide Assessment: no suicidal ideation, plan, or intent. Appropriate for outpatient / telehealth care at this time. Homicidal Ideation: no    Safety: No imminent risk of danger to self/others based on the factors considered below. Appropriate for outpatient level of care. Safety plan includes: 911, PES, hotlines, and interventions discussed today.    Risk factors: Depressed mood, historic suicidal ideation, alcohol abuse  Protective factors: Age >24 and <55, female gender, denies current suicidal ideation, does not have access to guns, patient is brittany for safety, no prior suicide attempts, no family h/o suicide, patient has social or family support, no active psychosis or cognitive dysfunction, physically healthy, already has outpatient services in place, compliant with recommended medications, and patient is future-oriented. LALITA 7 SCORE 3/4/2020 2/5/2020   LALITA-7 Total Score 3 5     Interpretation of LALITA-7 score: 5-9 = mild anxiety, 10-14 = moderate anxiety, 15+ = severe anxiety. Recommend referral to behavioral health for scores 10 or greater. PHQ Scores 11/10/2021 3/4/2020 2/5/2020 1/9/2020   PHQ2 Score 6 0 0 0   PHQ9 Score 12 4 4 0     Interpretation of Total Score Depression Severity: 1-4 = Minimal depression, 5-9 = Mild depression, 10-14 = Moderate depression, 15-19 = Moderately severe depression, 20-27 = Severe depression    Diagnosis:    1. Major depressive disorder, recurrent episode, mild with anxious distress ([facility] [address]    2. Panic disorder        Patient Active Problem List   Diagnosis    Chest pain    Dysmenorrhea    Herpes simplex virus (HSV) infection    Ventricular septal defect    Anxiety    Depression with anxiety         Plan:  Pt interventions:  Supportive techniques, Emphasized self-care as important for managing overall health, Cognitive strategies to target balanced thinking and Trained in improving communication skills. Pt Behavioral Change Plan:  Pt set the following goals:  1. Practice being mindful - paying attention to the present moment on purpose and in a nonjudgmental way  2. Practice diaphragmatic breathing throughout the day. If you start to feel lightheaded, try shortening your in-breath (imagine oxygen is very expensive and you can only afford to take in a little bit) while lengthening your out-breath. 3. Practice grounding exercises - noticing what your senses are experiencing in the moment  4. Continue cutting out tobacco, marijuana, and caffeine  5. Continue doing yoga and other forms of physical activity such as walking  6. Read the list of positive coping statements and choose some to repeat to yourself when you start to feel panic symptoms  7. Consider journaling using an lanre like Day One  8. Consider reading Mating in Captivity by Jessica Iglesias  9. Contact [employer] ([employer]) for career coaching (www.[employer]. org) at [phone]    Pt scheduled a F/U virtual visit.

## 2022-04-11 ENCOUNTER — TELEMEDICINE (OUTPATIENT)
Dept: PSYCHOLOGY | Age: 32
End: 2022-04-11
Payer: COMMERCIAL

## 2022-04-11 DIAGNOSIS — F33.0 MAJOR DEPRESSIVE DISORDER, RECURRENT EPISODE, MILD WITH ANXIOUS DISTRESS (HCC): ICD-10-CM

## 2022-04-11 DIAGNOSIS — F41.0 PANIC DISORDER: Primary | ICD-10-CM

## 2022-04-11 PROCEDURE — 90832 PSYTX W PT 30 MINUTES: CPT | Performed by: PSYCHOLOGIST

## 2022-04-11 NOTE — PROGRESS NOTES
Behavioral Health Consultation  Neeta Sofia Psy.D. Psychologist  4/11/2022  11:30 AM - 12 PM      Time spent with Patient: 30 minutes  This is patient's fifteenth [facility] appointment. Reason for Consult: Panic attacks  Referring Provider: Claudell Maid, MD  [address] [facility]    TELEHEALTH VISIT -- Audio/Visual (During MBXKG-99 public health emergency)  }  Pursuant to the emergency declaration under the [address] [place] [address], [facility] waiver authority and the [employer] Act, this Virtual Visit was conducted, with patient's consent, to reduce the patient's risk of exposure to COVID-19 and provide continuity of care for an established patient. Services were provided through a video synchronous discussion virtually to substitute for in-person clinic visit. Pt gave verbal informed consent to participate in telehealth services. Conducted a risk-benefit analysis and determined that the patient's presenting problems are consistent with the use of telepsychology. Determined that the patient has sufficient knowledge and skills in the use of technology enabling them to adequately benefit from telepsychology. It was determined that this patient was able to be properly treated without an in-person session. Patient verified that they were currently located at the address that was provided during registration.     Verified the following information:  Patient's identification: Yes  Patient location: [address] [place] [address]  Patient's call back number: [phone]  Patient's emergency contact's name and number, as well as permission to contact them if needed: Primary is martha [facility] [phone]  Extended Emergency Contact Information  Primary Emergency Contact: [facility]  Address: [address] [place] [address] Phone: [phone]  Mobile Phone: [phone]  Relation: Parent  Secondary Emergency Contact: Kurt Dye  Address: [address] [facility] [address] Phone: [phone]  Mobile Phone: [phone]  Relation: Spouse  Provider location: [place], [place]      S:  Pt has been sick with a head cold for the past 4 days. She had a couple days when she felt sad and lonely, but overall enjoying connecting with new friends at her job. Discussed patterns in pt's friendships. O:  Interventions:  -Supportive techniques  -Processed recent and past stressors and concerns  -Reinforced her efforts towards self-care  -Explored friendship dynamics  -Engaged in cognitive reappraisal      A:  MSE:  Appearance: good hygiene  and appropriate attire  Attitude: cooperative and friendly  Consciousness: alert  Orientation: oriented to person, place, time, general circumstance  Memory: recent and remote memory intact  Attention/Concentration: intact during session  Psychomotor Activity: normal  Eye Contact: normal  Speech: normal rate and volume, well-articulated  Mood: mildly anxious  Affect: congruent  Perception: within normal limits  Thought Content: within normal limits  Thought Process: logical, coherent and goal-directed  Insight: good  Judgment: intact  Ability to understand instructions: Yes  Ability to respond meaningfully: Yes  Morbid Ideation: no   Suicide Assessment: no suicidal ideation, plan, or intent. Appropriate for outpatient / telehealth care at this time. Homicidal Ideation: no    Safety: No imminent risk of danger to self/others based on the factors considered below. Appropriate for outpatient level of care. Safety plan includes: 911, PES, hotlines, and interventions discussed today.    Risk factors: Depressed mood, historic suicidal ideation, alcohol abuse  Protective factors: Age >24 and <55, female gender, denies current suicidal ideation, does not have access to guns, patient is brittany for safety, no prior suicide attempts, no family h/o suicide, patient has social or family support, no active psychosis or cognitive dysfunction, physically healthy, already has outpatient services in place, compliant with recommended medications, and patient is future-oriented. LALITA 7 SCORE 3/4/2020 2/5/2020   LALITA-7 Total Score 3 5     Interpretation of LALITA-7 score: 5-9 = mild anxiety, 10-14 = moderate anxiety, 15+ = severe anxiety. Recommend referral to behavioral health for scores 10 or greater. PHQ Scores 11/10/2021 3/4/2020 2/5/2020 1/9/2020   PHQ2 Score 6 0 0 0   PHQ9 Score 12 4 4 0     Interpretation of Total Score Depression Severity: 1-4 = Minimal depression, 5-9 = Mild depression, 10-14 = Moderate depression, 15-19 = Moderately severe depression, 20-27 = Severe depression    Diagnosis:    1. Panic disorder    2. Major depressive disorder, recurrent episode, mild with anxious distress [facility])        Patient Active Problem List   Diagnosis    Chest pain    Dysmenorrhea    Herpes simplex virus (HSV) infection    Ventricular septal defect    Anxiety    Depression with anxiety         Plan:  Pt interventions:  Supportive techniques, Emphasized self-care as important for managing overall health and Cognitive strategies to target balanced thinking. Pt Behavioral Change Plan:  Pt set the following goals:  1. Practice being mindful - paying attention to the present moment on purpose and in a nonjudgmental way  2. Practice diaphragmatic breathing throughout the day. If you start to feel lightheaded, try shortening your in-breath (imagine oxygen is very expensive and you can only afford to take in a little bit) while lengthening your out-breath. 3. Practice grounding exercises - noticing what your senses are experiencing in the moment  4. Continue cutting out tobacco, marijuana, and caffeine  5. Continue doing yoga and other forms of physical activity such as walking  6.  Read the list of positive coping statements and choose some to repeat to yourself when you start to feel panic symptoms  7. Consider journaling using an lanre like Day One  8. Consider reading Mating in Captivity by Jessica Iglesias  9. Contact [employer]) for career coaching (www.[employer]. org) at [phone]    Pt scheduled a F/U virtual visit.

## 2022-04-11 NOTE — PATIENT INSTRUCTIONS
Goals: 1. Practice being mindful - paying attention to the present moment on purpose and in a nonjudgmental way  2. Practice diaphragmatic breathing throughout the day. If you start to feel lightheaded, try shortening your in-breath (imagine oxygen is very expensive and you can only afford to take in a little bit) while lengthening your out-breath. 3. Practice grounding exercises - noticing what your senses are experiencing in the moment  4. Continue cutting out tobacco, marijuana, and caffeine  5. Continue doing yoga and other forms of physical activity such as walking  6. Read the list of positive coping statements and choose some to repeat to yourself when you start to feel panic symptoms  7. Consider journaling using an lanre like Day One  8. Consider reading Mating in Captivity by Jessica Iglesias  9. Contact [employer] ([employer]) for career coaching ([email or web address]) at [phone]

## 2022-05-24 ENCOUNTER — TELEMEDICINE (OUTPATIENT)
Dept: PSYCHOLOGY | Age: 32
End: 2022-05-24
Payer: COMMERCIAL

## 2022-05-24 DIAGNOSIS — F33.41 RECURRENT MAJOR DEPRESSIVE DISORDER, IN PARTIAL REMISSION (HCC): ICD-10-CM

## 2022-05-24 DIAGNOSIS — F41.0 PANIC DISORDER: Primary | ICD-10-CM

## 2022-05-24 PROCEDURE — 90832 PSYTX W PT 30 MINUTES: CPT | Performed by: PSYCHOLOGIST

## 2022-05-24 NOTE — PROGRESS NOTES
Behavioral Health Consultation  Jessica Giles Psy.D. Psychologist  5/24/2022  11-11:30 AM      Time spent with Patient: 30 minutes  This is patient's sixteenth [facility] appointment. Reason for Consult: Panic attacks  Referring Provider: Hortencia Brown MD  [facility]    TELEHEALTH VISIT -- Audio/Visual (During TYJNA-38 public health emergency)  }  Pursuant to the emergency declaration under the [address] [place] [address], [facility] waiver authority and the [employer] Act, this Virtual Visit was conducted, with patient's consent, to reduce the patient's risk of exposure to COVID-19 and provide continuity of care for an established patient. Services were provided through a video synchronous discussion virtually to substitute for in-person clinic visit. Pt gave verbal informed consent to participate in telehealth services. Conducted a risk-benefit analysis and determined that the patient's presenting problems are consistent with the use of telepsychology. Determined that the patient has sufficient knowledge and skills in the use of technology enabling them to adequately benefit from telepsychology. It was determined that this patient was able to be properly treated without an in-person session. Patient verified that they were currently located at the address that was provided during registration.     Verified the following information:  Patient's identification: Yes  Patient location: [address] [place] [address]  Patient's call back number: [phone]  Patient's emergency contact's name and number, as well as permission to contact them if needed: Primary is martha Wyatt [phone]  Extended Emergency Contact Information  Primary Emergency Contact: [facility]  Address: [address] [place] [address] Phone: [phone]  Mobile Phone: [phone]  Relation: Parent  Secondary Emergency Contact: Kurt Dye  Address: [address]           [address] [facility] of [address] Phone: [phone]  Mobile Phone: [phone]  Relation: Spouse  Provider location: [place], [place]      S:  Pt has been doing well overall, aside from one week when she felt depressed. Some financial concerns. Enjoyed a [place] vacation with her friend. Discussed communication with her  and differences in their needs. O:  Interventions:  -Supportive techniques  -Processed recent and past stressors and concerns  -Reinforced her efforts towards self-care  -Explored relationship dynamics  -Engaged in cognitive reappraisal      A:  MSE:  Appearance: good hygiene  and appropriate attire  Attitude: cooperative and friendly  Consciousness: alert  Orientation: oriented to person, place, time, general circumstance  Memory: recent and remote memory intact  Attention/Concentration: intact during session  Psychomotor Activity: normal  Eye Contact: normal  Speech: normal rate and volume, well-articulated  Mood: mildly anxious  Affect: congruent  Perception: within normal limits  Thought Content: within normal limits  Thought Process: logical, coherent and goal-directed  Insight: good  Judgment: intact  Ability to understand instructions: Yes  Ability to respond meaningfully: Yes  Morbid Ideation: no   Suicide Assessment: no suicidal ideation, plan, or intent. Appropriate for outpatient / telehealth care at this time. Homicidal Ideation: no    Safety: No imminent risk of danger to self/others based on the factors considered below. Appropriate for outpatient level of care. Safety plan includes: 911, PES, hotlines, and interventions discussed today.    Risk factors: Depressed mood, historic suicidal ideation, alcohol abuse  Protective factors: Age >24 and <55, female gender, denies current suicidal ideation, does not have access to guns, patient is brittany and choose some to repeat to yourself when you start to feel panic symptoms  7. Consider journaling using an lanre like Day One  8. Consider reading Mating in Captivity by Jessica Iglesias  9. Contact [employer]) for career coaching (www.[email or web address]) at [phone]    Pt scheduled a F/U virtual visit.

## 2022-05-24 NOTE — PATIENT INSTRUCTIONS
Goals: 1. Practice being mindful - paying attention to the present moment on purpose and in a nonjudgmental way  2. Practice diaphragmatic breathing throughout the day. If you start to feel lightheaded, try shortening your in-breath (imagine oxygen is very expensive and you can only afford to take in a little bit) while lengthening your out-breath. 3. Practice grounding exercises - noticing what your senses are experiencing in the moment  4. Continue cutting out tobacco, marijuana, and caffeine  5. Continue doing yoga and other forms of physical activity such as walking  6. Read the list of positive coping statements and choose some to repeat to yourself when you start to feel panic symptoms  7. Consider journaling using an lanre like Day One  8. Consider reading Mating in Captivity by Jessica Iglesias  9. Contact [employer] ([employer]) for career coaching ([email or web address]) at [phone]

## 2022-06-30 ENCOUNTER — TELEMEDICINE (OUTPATIENT)
Dept: PSYCHOLOGY | Age: 32
End: 2022-06-30
Payer: COMMERCIAL

## 2022-06-30 DIAGNOSIS — F41.0 PANIC DISORDER: Primary | ICD-10-CM

## 2022-06-30 DIAGNOSIS — F33.41 RECURRENT MAJOR DEPRESSIVE DISORDER, IN PARTIAL REMISSION (HCC): ICD-10-CM

## 2022-06-30 PROCEDURE — 90832 PSYTX W PT 30 MINUTES: CPT | Performed by: PSYCHOLOGIST

## 2022-06-30 NOTE — PATIENT INSTRUCTIONS
Goals: 1. Practice being mindful - paying attention to the present moment on purpose and in a nonjudgmental way  2. Practice diaphragmatic breathing throughout the day. If you start to feel lightheaded, try shortening your in-breath (imagine oxygen is very expensive and you can only afford to take in a little bit) while lengthening your out-breath. 3. Practice grounding exercises - noticing what your senses are experiencing in the moment  4. Continue cutting out tobacco, marijuana, and caffeine  5. Continue doing yoga and other forms of physical activity such as walking  6. Read the list of positive coping statements and choose some to repeat to yourself when you start to feel panic symptoms  7. Consider journaling using an lanre like Day One  8. Consider reading Mating in Captivity by Jessica Iglesias  9. Contact [employer] ([employer]) for career coaching ([email or web address]) at [phone]

## 2022-06-30 NOTE — PROGRESS NOTES
Behavioral Health Consultation  Adalberto Gaucher, Psy.D. Psychologist  6/30/2022  11:30 AM - 12 PM      Time spent with Patient: 30 minutes  This is patient's seventeenth [facility] appointment. Reason for Consult: Panic attacks  Referring Provider: Keshawn Vega MD  [facility]    TELEHEALTH VISIT -- Audio/Visual (During KOHDR-03 public health emergency)  }  Pursuant to the emergency declaration under the [address] [place] [address], [facility] waiver authority and the [employer] Act, this Virtual Visit was conducted, with patient's consent, to reduce the patient's risk of exposure to COVID-19 and provide continuity of care for an established patient. Services were provided through a video synchronous discussion virtually to substitute for in-person clinic visit. Pt gave verbal informed consent to participate in telehealth services. Conducted a risk-benefit analysis and determined that the patient's presenting problems are consistent with the use of telepsychology. Determined that the patient has sufficient knowledge and skills in the use of technology enabling them to adequately benefit from telepsychology. It was determined that this patient was able to be properly treated without an in-person session. Patient verified that they were currently located at the address that was provided during registration.     Verified the following information:  Patient's identification: Yes  Patient location: [address] [place] [address]  Patient's call back number: [phone]  Patient's emergency contact's name and number, as well as permission to contact them if needed: Primary is martha Kraus [phone]  Extended Emergency Contact Information  Primary Emergency Contact: [facility]  Address: [address] [place] [address] Phone: [phone]  Mobile Phone: [phone]  Relation: Parent  Secondary Emergency Contact: Kurt Dye  Address: [address] [address] [address]  Phone: [phone]  Mobile Phone: [phone]  Relation: Spouse  Provider location: [place]:  Pt is tired from working a lot. Likes her coworkers. Discussed relationship stress that has led her to party more and get piercings lately. O:  Interventions:  -Supportive techniques  -Processed recent and past stressors and concerns  -Reinforced her efforts towards self-care  -Explored relationship dynamics  -Engaged in parts work (including direct access)      A:  MSE:  Appearance: good hygiene  and appropriate attire  Attitude: cooperative and friendly  Consciousness: alert  Orientation: oriented to person, place, time, general circumstance  Memory: recent and remote memory intact  Attention/Concentration: intact during session  Psychomotor Activity: normal  Eye Contact: normal  Speech: normal rate and volume, well-articulated  Mood: dysphoric, anxious  Affect: congruent  Perception: within normal limits  Thought Content: within normal limits  Thought Process: logical, coherent and goal-directed  Insight: good  Judgment: intact  Ability to understand instructions: Yes  Ability to respond meaningfully: Yes  Morbid Ideation: no   Suicide Assessment: no suicidal ideation, plan, or intent. Appropriate for outpatient / telehealth care at this time. Homicidal Ideation: no    Safety: No imminent risk of danger to self/others based on the factors considered below. Appropriate for outpatient level of care. Safety plan includes: 911, PES, hotlines, and interventions discussed today.    Risk factors: Depressed mood, historic suicidal ideation, alcohol abuse  Protective factors: Age >24 and <55, female gender, denies current suicidal ideation, does not have access to guns, patient is brittany for safety, no prior suicide attempts, no family h/o suicide, patient has social or family support, no active psychosis or cognitive dysfunction, physically healthy, already has outpatient services in place, compliant with recommended medications, and patient is future-oriented. LALITA 7 SCORE 3/4/2020 2/5/2020   LALITA-7 Total Score 3 5     Interpretation of LALITA-7 score: 5-9 = mild anxiety, 10-14 = moderate anxiety, 15+ = severe anxiety. Recommend referral to behavioral health for scores 10 or greater. PHQ Scores 11/10/2021 3/4/2020 2/5/2020 1/9/2020   PHQ2 Score 6 0 0 0   PHQ9 Score 12 4 4 0     Interpretation of Total Score Depression Severity: 1-4 = Minimal depression, 5-9 = Mild depression, 10-14 = Moderate depression, 15-19 = Moderately severe depression, 20-27 = Severe depression    Diagnosis:    1. Panic disorder    2. Recurrent major depressive disorder, in partial remission [facility])        Patient Active Problem List   Diagnosis    Chest pain    Dysmenorrhea    Herpes simplex virus (HSV) infection    Ventricular septal defect    Anxiety    Depression with anxiety         Plan:  Pt interventions:  Supportive techniques, Emphasized self-care as important for managing overall health and Cognitive strategies to target balanced thinking. Pt Behavioral Change Plan:  Pt set the following goals:  1. Practice being mindful - paying attention to the present moment on purpose and in a nonjudgmental way  2. Practice diaphragmatic breathing throughout the day. If you start to feel lightheaded, try shortening your in-breath (imagine oxygen is very expensive and you can only afford to take in a little bit) while lengthening your out-breath. 3. Practice grounding exercises - noticing what your senses are experiencing in the moment  4. Continue cutting out tobacco, marijuana, and caffeine  5. Continue doing yoga and other forms of physical activity such as walking  6.  Read the list of positive coping statements and choose some to repeat to yourself when you start to feel panic symptoms  7. Consider journaling using an lanre like Day One  8. Consider reading Mating in Captivity by Jessica Iglesias  9. Contact [employer] ([employer]) for career coaching ([email or web address]) at [phone]    Pt scheduled a F/U virtual visit.

## 2022-08-03 ENCOUNTER — TELEMEDICINE (OUTPATIENT)
Dept: PSYCHOLOGY | Age: 32
End: 2022-08-03

## 2022-08-03 DIAGNOSIS — F41.0 PANIC DISORDER: Primary | ICD-10-CM

## 2022-08-03 DIAGNOSIS — F33.41 RECURRENT MAJOR DEPRESSIVE DISORDER, IN PARTIAL REMISSION (HCC): ICD-10-CM

## 2022-08-03 PROCEDURE — 90832 PSYTX W PT 30 MINUTES: CPT | Performed by: PSYCHOLOGIST

## 2022-08-03 NOTE — PROGRESS NOTES
Behavioral Health Consultation  Katie Dominguez Psy.D. Psychologist  8/3/2022  11:30 AM - 12 PM      Time spent with Patient: 30 minutes  This is patient's  eighteenth  [facility] appointment. Reason for Consult: Panic attacks  Referring Provider: Laxmi Solano MD  [address] [facility]    TELEHEALTH VISIT -- Audio/Visual (During QSUOC-10 public health emergency)  }  Pursuant to the emergency declaration under the [address] [place] [address], [facility] waiver authority and the [employer] and [employer] Act, this Virtual Visit was conducted, with patient's consent, to reduce the patient's risk of exposure to COVID-19 and provide continuity of care for an established patient. Services were provided through a video synchronous discussion virtually to substitute for in-person clinic visit. Pt gave verbal informed consent to participate in telehealth services. Conducted a risk-benefit analysis and determined that the patient's presenting problems are consistent with the use of telepsychology. Determined that the patient has sufficient knowledge and skills in the use of technology enabling them to adequately benefit from telepsychology. It was determined that this patient was able to be properly treated without an in-person session. Patient verified that they were currently located at the address that was provided during registration.     Verified the following information:  Patient's identification: Yes  Patient location: [address] [place] [address]  Patient's call back number: [phone]  Patient's emergency contact's name and number, as well as permission to contact them if needed: Primary is martha Jaramillo [phone]  Extended Emergency Contact Information  Primary Emergency Contact: [facility]  Address: [address] [place] [address] Phone: [phone]  Mobile Phone: [phone]  Relation: Parent  Secondary Emergency Contact: Kurt Dye  Address: [address] [facility] [address] Phone: [phone]  Mobile Phone: [phone]  Relation: Spouse  Provider location: [place], [place]      S:  Pt has been feeling better. Busy with work. No longer engaging in impulsive behavior. Felt sad for a bit. Now feeling back to herself. It has been at least 6 months since she was having panic attacks regularly. Reflected on factors that affect her health choices, including nutrition intake. Getting enough sleep overall. Discussed dynamics w/ her  and [place] to [place]. O:  Interventions:  -Supportive techniques  -Processed recent and past stressors and concerns  -Reinforced her efforts towards self-care  -Explored emotional state and process  -Reflected on relationship dynamics      A:  MSE:  Appearance: good hygiene  and appropriate attire  Attitude: cooperative and friendly  Consciousness: alert  Orientation: oriented to person, place, time, general circumstance  Memory: recent and remote memory intact  Attention/Concentration: intact during session  Psychomotor Activity: normal  Eye Contact: normal  Speech: normal rate and volume, well-articulated  Mood: mildly dysphoric  Affect: congruent  Perception: within normal limits  Thought Content: within normal limits  Thought Process: logical, coherent and goal-directed  Insight: good  Judgment: intact  Ability to understand instructions: Yes  Ability to respond meaningfully: Yes  Morbid Ideation: no   Suicide Assessment: no suicidal ideation, plan, or intent. Appropriate for outpatient / telehealth care at this time. Homicidal Ideation: no    Safety: No imminent risk of danger to self/others based on the factors considered below. Appropriate for outpatient level of care. Safety plan includes: 911, PES, hotlines, and interventions discussed today.    Risk factors: Depressed mood, historic suicidal ideation, alcohol abuse  Protective factors: Age >24 and <55, female gender, denies current suicidal ideation, does not have access to guns, patient is birttany for safety, no prior suicide attempts, no family h/o suicide, patient has social or family support, no active psychosis or cognitive dysfunction, physically healthy, already has outpatient services in place, compliant with recommended medications, and patient is future-oriented. LALITA 7 SCORE 3/4/2020 2/5/2020   LALITA-7 Total Score 3 5     Interpretation of LALITA-7 score: 5-9 = mild anxiety, 10-14 = moderate anxiety, 15+ = severe anxiety. Recommend referral to behavioral health for scores 10 or greater. PHQ Scores 11/10/2021 3/4/2020 2/5/2020 1/9/2020   PHQ2 Score 6 0 0 0   PHQ9 Score 12 4 4 0     Interpretation of Total Score Depression Severity: 1-4 = Minimal depression, 5-9 = Mild depression, 10-14 = Moderate depression, 15-19 = Moderately severe depression, 20-27 = Severe depression    Diagnosis:    1. Panic disorder    2. Recurrent major depressive disorder, in partial remission [facility])          Patient Active Problem List   Diagnosis    Chest pain    Dysmenorrhea    Herpes simplex virus (HSV) infection    Ventricular septal defect    Anxiety    Depression with anxiety         Plan:  Pt interventions:  Supportive techniques, Emphasized self-care as important for managing overall health and Cognitive strategies to target balanced thinking . Pt Behavioral Change Plan:  Pt set the following goals:  1. Practice being mindful - paying attention to the present moment on purpose and in a nonjudgmental way  2. Practice diaphragmatic breathing throughout the day. If you start to feel lightheaded, try shortening your in-breath (imagine oxygen is very expensive and you can only afford to take in a little bit) while lengthening your out-breath.   3. Practice grounding exercises - noticing what your senses are experiencing in the moment  4. Continue cutting out tobacco, marijuana, and caffeine  5. Continue doing yoga and other forms of physical activity such as walking  6. Read the list of positive coping statements and choose some to repeat to yourself when you start to feel panic symptoms  7. Consider journaling using an lanre like Day One  8. Consider reading Mating in Captivity by Jessica Iglesias  9. Contact [employer]) for career coaching ([email or web address]) at [phone]    Pt scheduled a F/U virtual visit.

## 2022-08-03 NOTE — PATIENT INSTRUCTIONS
Goals: 1. Practice being mindful - paying attention to the present moment on purpose and in a nonjudgmental way  2. Practice diaphragmatic breathing throughout the day. If you start to feel lightheaded, try shortening your in-breath (imagine oxygen is very expensive and you can only afford to take in a little bit) while lengthening your out-breath. 3. Practice grounding exercises - noticing what your senses are experiencing in the moment  4. Continue cutting out tobacco, marijuana, and caffeine  5. Continue doing yoga and other forms of physical activity such as walking  6. Read the list of positive coping statements and choose some to repeat to yourself when you start to feel panic symptoms  7. Consider journaling using an lanre like Day One  8. Consider reading Mating in Captivity by Jessica Iglesias  9. Contact [employer]) for career coaching (www.[email or web address]) at [phone]

## 2022-11-01 ENCOUNTER — TELEMEDICINE (OUTPATIENT)
Dept: PSYCHOLOGY | Age: 32
End: 2022-11-01

## 2022-11-01 DIAGNOSIS — F33.41 RECURRENT MAJOR DEPRESSIVE DISORDER, IN PARTIAL REMISSION (HCC): ICD-10-CM

## 2022-11-01 DIAGNOSIS — F41.0 PANIC DISORDER: Primary | ICD-10-CM

## 2022-11-01 PROCEDURE — 90832 PSYTX W PT 30 MINUTES: CPT | Performed by: PSYCHOLOGIST

## 2022-11-01 NOTE — PATIENT INSTRUCTIONS
Goals: 1. Practice being mindful - paying attention to the present moment on purpose and in a nonjudgmental way  2. Practice diaphragmatic breathing throughout the day. If you start to feel lightheaded, try shortening your in-breath (imagine oxygen is very expensive and you can only afford to take in a little bit) while lengthening your out-breath. 3. Practice grounding exercises - noticing what your senses are experiencing in the moment  4. Continue cutting out tobacco, marijuana, and caffeine  5. Continue doing yoga and other forms of physical activity such as walking  6. Read the list of positive coping statements and choose some to repeat to yourself when you start to feel panic symptoms  7. Consider journaling using an lanre like Day One  8. Consider reading Mating in Captivity by Jessica Iglesias  9. Contact [employer] ([employer]) for career coaching ([email or web address]) at [phone]

## 2022-11-01 NOTE — PROGRESS NOTES
Behavioral Health Consultation  Kika Alberto Psy.D. Psychologist  11/1/2022  11:30 AM - 12 PM      Time spent with Patient: 30 minutes  This is patient's  nineteenth  [facility] appointment. Reason for Consult: Panic attacks  Referring Provider: MD Michelle Garrison W Shereen MCMILLAN [facility]    TELEHEALTH VISIT -- Audio/Visual (During AKVAB-78 public health emergency)  }  Pursuant to the emergency declaration under the [address] [place] [address], On license of [facility] waiver authority and the [employer] Act, this Virtual Visit was conducted, with patient's consent, to reduce the patient's risk of exposure to COVID-19 and provide continuity of care for an established patient. Services were provided through a video synchronous discussion virtually to substitute for in-person clinic visit. Pt gave verbal informed consent to participate in telehealth services. Conducted a risk-benefit analysis and determined that the patient's presenting problems are consistent with the use of telepsychology. Determined that the patient has sufficient knowledge and skills in the use of technology enabling them to adequately benefit from telepsychology. It was determined that this patient was able to be properly treated without an in-person session. Patient verified that they were currently located at the address that was provided during registration.     Verified the following information:  Patient's identification: Yes  Patient location: [address] [place] [address]  Patient's call back number: [phone]  Patient's emergency contact's name and number, as well as permission to contact them if needed: Primary is martha Hayes [phone]  Extended Emergency Contact Information  Primary Emergency Contact: [facility]  Address: [address] [place] [address] Phone: [phone]  Mobile Phone: [phone]  Relation: Parent  Secondary Emergency Contact: HardikKurt  Address: [address] [facility]           Filippo Miller Gonsalez of [address] Phone: [phone]  Mobile Phone: [phone]  Relation: Spouse  Provider location: Rubi Fonseca:  Pt is tired from working a lot. Pt has been getting out a lot. She has been drinking more alcohol and smoking more marijuana, both to avoid and to enhance experiences. Discussed her relationship with substances. She reflected on her views about her marriage and relationships. O:  Interventions:  -Supportive techniques  -Processed recent and past stressors and concerns  -Reinforced her efforts towards self-care  -Reflected on relationship dynamics  -Discussed polarization between parts that seek vs avoid connection. -Explored substance use and its role in / impact on her life      A:  MSE:  Appearance: good hygiene  and appropriate attire  Attitude: cooperative and friendly  Consciousness: alert  Orientation: oriented to person, place, time, general circumstance  Memory: recent and remote memory intact  Attention/Concentration: intact during session  Psychomotor Activity: normal  Eye Contact: normal  Speech: normal rate and volume, well-articulated  Mood: mildly dysphoric  Affect: congruent  Perception: within normal limits  Thought Content: within normal limits  Thought Process: logical, coherent and goal-directed  Insight: good  Judgment: intact  Ability to understand instructions: Yes  Ability to respond meaningfully: Yes  Morbid Ideation: no   Suicide Assessment: no suicidal ideation, plan, or intent. Appropriate for outpatient / telehealth care at this time. Homicidal Ideation: no    Safety: No imminent risk of danger to self/others based on the factors considered below. Appropriate for outpatient level of care. Safety plan includes: 911, PES, hotlines, and interventions discussed today.    Risk factors: Depressed mood, historic suicidal ideation, alcohol abuse  Protective factors: Age >24 and <55, female gender, denies current suicidal ideation, does not have access to guns, patient is brittany for safety, no prior suicide attempts, no family h/o suicide, patient has social or family support, no active psychosis or cognitive dysfunction, physically healthy, already has outpatient services in place, compliant with recommended medications, and patient is future-oriented. LALITA 7 SCORE 3/4/2020 2/5/2020   LALITA-7 Total Score 3 5     Interpretation of LALITA-7 score: 5-9 = mild anxiety, 10-14 = moderate anxiety, 15+ = severe anxiety. Recommend referral to behavioral health for scores 10 or greater. PHQ Scores 11/10/2021 3/4/2020 2/5/2020 1/9/2020   PHQ2 Score 6 0 0 0   PHQ9 Score 12 4 4 0     Interpretation of Total Score Depression Severity: 1-4 = Minimal depression, 5-9 = Mild depression, 10-14 = Moderate depression, 15-19 = Moderately severe depression, 20-27 = Severe depression    Diagnosis:    1. Panic disorder    2. Recurrent major depressive disorder, in partial remission [facility])            Patient Active Problem List   Diagnosis    Chest pain    Dysmenorrhea    Herpes simplex virus (HSV) infection    Ventricular septal defect    Anxiety    Depression with anxiety         Plan:  Pt interventions:  Supportive techniques, Emphasized self-care as important for managing overall health and Cognitive strategies to target balanced thinking . Pt Behavioral Change Plan:  Pt set the following goals:  1. Practice being mindful - paying attention to the present moment on purpose and in a nonjudgmental way  2. Practice diaphragmatic breathing throughout the day. If you start to feel lightheaded, try shortening your in-breath (imagine oxygen is very expensive and you can only afford to take in a little bit) while lengthening your out-breath. 3. Practice grounding exercises - noticing what your senses are experiencing in the moment  4. Continue cutting out tobacco, marijuana, and caffeine  5. Continue doing yoga and other forms of physical activity such as walking  6. Read the list of positive coping statements and choose some to repeat to yourself when you start to feel panic symptoms  7. Consider journaling using an lanre like [employer] One  8. Consider reading Mating in Captivity by Jessica Iglesias  9. Contact [employer] for career coaching ([email or web address] org) at ([phone] [phone]    Pt scheduled a F/U virtual visit.

## 2022-12-12 ENCOUNTER — CLINICAL DOCUMENTATION (OUTPATIENT)
Dept: PSYCHOLOGY | Age: 32
End: 2022-12-12

## 2023-05-16 ENCOUNTER — TELEMEDICINE (OUTPATIENT)
Dept: PSYCHOLOGY | Age: 33
End: 2023-05-16

## 2023-05-16 DIAGNOSIS — F41.0 PANIC DISORDER: ICD-10-CM

## 2023-05-16 DIAGNOSIS — F33.0 MAJOR DEPRESSIVE DISORDER, RECURRENT EPISODE, MILD WITH ANXIOUS DISTRESS (HCC): Primary | ICD-10-CM

## 2023-05-16 PROCEDURE — 90832 PSYTX W PT 30 MINUTES: CPT | Performed by: PSYCHOLOGIST

## 2023-05-16 NOTE — PATIENT INSTRUCTIONS
Goals:  1. Practice being mindful - paying attention to the present moment on purpose and in a nonjudgmental way  2. Practice diaphragmatic breathing throughout the day. If you start to feel lightheaded, try shortening your in-breath (imagine oxygen is very expensive and you can only afford to take in a little bit) while lengthening your out-breath.  3. Practice grounding exercises - noticing what your senses are experiencing in the moment  4. Continue cutting out tobacco, marijuana, and caffeine  5. Continue doing yoga and other forms of physical activity such as walking  6. Read the list of positive coping statements and choose some to repeat to yourself when you start to feel panic symptoms  7. Consider journaling using an lanre like [employer] One  8. Consider reading Mating in Captivity by Jessica Iglesias  9. Contact [employer] for career coaching ([email or web address]) at [phone]

## 2023-05-16 NOTE — PROGRESS NOTES
[phone]  Relation: Parent  Secondary Emergency Contact: Kurt Dye  Address: [address] [place] [address] [place]  Home Phone: [phone]  Mobile Phone: [phone]  Relation: Spouse  Provider location: [place], [place]      S:  Pt reflected on relationship dynamics and challenges with her . 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 She tends to go out after work and party into the early hours.      Pt has not been eating much for the past few weeks. Lost 8 lbs in the last month, which feels unhealthy for her. May be stress related.    Pt finally bought a car a month ago, which has been freeing.        O:  Interventions:  -Supportive techniques  -Processed recent and past stressors and concerns  -Reinforced her efforts towards self-care  -Reflected on relationship dynamics and challenges  -Explored changes in functioning  -Brainstormed adaptive, non-substance-based coping strategies she can use to complete her stress cycle after work.      A:  MSE:  Appearance: good hygiene  and appropriate attire  Attitude: cooperative and friendly  Consciousness: alert  Orientation: oriented to person, place, time, general circumstance  Memory: recent and remote memory intact  Attention/Concentration: intact during session  Psychomotor Activity: normal  Eye Contact: normal  Speech: normal rate and volume, well-articulated  Mood: anxious, stressed  Affect: congruent  Perception: within normal limits  Thought Content: within normal limits  Thought Process: logical, coherent and goal-directed  Insight: good  Judgment: intact  Ability to understand instructions: Yes  Ability to respond meaningfully: Yes  Morbid Ideation: no   Suicide Assessment: no suicidal ideation, plan, or intent. Appropriate for outpatient / telehealth care at this time.  Homicidal Ideation: no    Safety: No imminent risk of danger to self/others based on the factors considered